# Patient Record
Sex: FEMALE | Race: WHITE | URBAN - METROPOLITAN AREA
[De-identification: names, ages, dates, MRNs, and addresses within clinical notes are randomized per-mention and may not be internally consistent; named-entity substitution may affect disease eponyms.]

---

## 2017-03-08 ENCOUNTER — TRANSFERRED RECORDS (OUTPATIENT)
Dept: HEALTH INFORMATION MANAGEMENT | Facility: CLINIC | Age: 57
End: 2017-03-08

## 2018-06-21 ENCOUNTER — MEDICAL CORRESPONDENCE (OUTPATIENT)
Dept: HEALTH INFORMATION MANAGEMENT | Facility: CLINIC | Age: 58
End: 2018-06-21

## 2018-07-10 ENCOUNTER — OFFICE VISIT (OUTPATIENT)
Dept: PSYCHIATRY | Facility: CLINIC | Age: 58
End: 2018-07-10
Payer: COMMERCIAL

## 2018-07-10 VITALS
HEART RATE: 101 BPM | SYSTOLIC BLOOD PRESSURE: 124 MMHG | TEMPERATURE: 95.2 F | DIASTOLIC BLOOD PRESSURE: 85 MMHG | WEIGHT: 169.4 LBS

## 2018-07-10 DIAGNOSIS — F33.2 SEVERE EPISODE OF RECURRENT MAJOR DEPRESSIVE DISORDER, WITHOUT PSYCHOTIC FEATURES (H): Primary | ICD-10-CM

## 2018-07-10 RX ORDER — DEXTROAMPHETAMINE SACCHARATE, AMPHETAMINE ASPARTATE MONOHYDRATE, DEXTROAMPHETAMINE SULFATE AND AMPHETAMINE SULFATE 7.5; 7.5; 7.5; 7.5 MG/1; MG/1; MG/1; MG/1
30 CAPSULE, EXTENDED RELEASE ORAL
COMMUNITY
Start: 2018-05-08

## 2018-07-10 RX ORDER — GAUZE BANDAGE 4" X 4"
1200 BANDAGE TOPICAL
COMMUNITY
Start: 2011-09-08

## 2018-07-10 RX ORDER — PAROXETINE 30 MG/1
60 TABLET, FILM COATED ORAL
COMMUNITY

## 2018-07-10 RX ORDER — KETOROLAC TROMETHAMINE 30 MG/ML
30-60 INJECTION, SOLUTION INTRAMUSCULAR; INTRAVENOUS
COMMUNITY
Start: 2015-06-03

## 2018-07-10 RX ORDER — BUPROPION HYDROCHLORIDE 150 MG/1
TABLET ORAL
COMMUNITY
Start: 2018-05-08

## 2018-07-10 RX ORDER — ALPRAZOLAM 0.5 MG
TABLET ORAL
COMMUNITY
Start: 2018-05-08

## 2018-07-10 ASSESSMENT — PAIN SCALES - GENERAL: PAINLEVEL: MODERATE PAIN (5)

## 2018-07-10 NOTE — MR AVS SNAPSHOT
After Visit Summary   7/10/2018    Samira Romero    MRN: [id number]           Patient Information     Date Of Birth          1960        Visit Information        Provider Department      7/10/2018 4:00 PM Cristine Snow MD [facility]        Today's Diagnoses     Severe episode of recurrent major depressive disorder, without psychotic features (H)    -  1       Follow-ups after your visit        Who to contact     Please call your clinic at [phone] to:    Ask questions about your health    Make or cancel appointments    Discuss your medicines    Learn about your test results    Speak to your doctor            Additional Information About Your Visit        MyChart Information     loanDepot is an electronic gateway that provides easy, online access to your medical records. With loanDepot, you can request a clinic appointment, read your test results, renew a prescription or communicate with your care team.     To sign up for loanDepot visit the website at [email or web address]   You will be asked to enter the access code listed below, as well as some personal information. Please follow the directions to create your username and password.     Your access code is: [email or web address]  Expires: 10/8/2018  4:08 PM     Your access code will  in 90 days. If you need help or a new code, please contact your [facility] or call [phone] for assistance.        Care EveryWhere ID     This is your Care EveryWhere ID. This could be used by other organizations to access your [facility] medical records  [id number]        Your Vitals Were     Pulse Temperature                101 95.2  F (35.1  C)           Blood Pressure from Last 3 Encounters:   07/10/18 124/85    Weight from Last 3 Encounters:   07/10/18 76.8 kg (169 lb 6.4 oz)              Today, you had the following     No orders found for display       Primary Care Provider Office Phone # Fax #    [facility] [place] [facility] [phone]       [address] [facility] [address] [place] [address]        Equal Access to Services     BIANCA KHAN : Hadii faye jimenez risao Sovitaali, waaxda luqadaha, qaybta kaalmada adevenkatada, jaimee skinnerbeth lamavalentino romero jade robertson. So [facility] [phone].    ATENCIÓN: Si habla español, tiene a cole disposición servicios gratuitos de asistencia lingüística. Llame al [phone].    We comply with applicable federal civil rights laws and [place] laws. We do not discriminate on the basis of race, color, national origin, age, disability, sex, sexual orientation, or gender identity.            Thank you!     Thank you for choosing [facility]  for your care. Our goal is always to provide you with excellent care. Hearing back from our patients is one way we can continue to improve our services. Please take a few minutes to complete the written survey that you may receive in the mail after your visit with us. Thank you!             Your Updated Medication List - Protect others around you: Learn how to safely use, store and throw away your medicines at www.disposemymeds.org.          This list is accurate as of 7/10/18 11:59 PM.  Always use your most recent med list.                   Brand Name Dispense Instructions for use Diagnosis    ALPRAZolam 0.5 MG tablet    XANAX     CANCEL PRIOR RX -- UPDATED DOSE INSTRUCTIONS -- Take one-half to one tab by mouth at bedtime as needed for anxiety.        amphetamine-dextroamphetamine 30 MG per 24 hr capsule    ADDERALL XR     Take 30 mg by mouth        buPROPion 150 MG 24 hr tablet    WELLBUTRIN XL     TAKE 2 TABLETS EVERY       MORNING        cholecalciferol 5000 units Caps capsule    vitamin D3          Fish Oil 435 MG Caps      Take 1,200 mg by mouth        ketorolac 60 MG/2ML Soln injection    TORADOL     Inject 30-60 mg into the muscle        MULTIPLE VITAMINS-MINERALS PO           Octacosanol 1000-5 MCG-UNIT Tabs            PARoxetine 30 MG tablet    PAXIL     Take 60 mg by mouth

## 2018-07-10 NOTE — PROGRESS NOTES
" [facility] TMS [facility]  [address], [address]  [place] [address]  New Patient Evaluation       Samira Romero is a 57 year old female who prefers the name \"Samira\" and pronoun she.  Therapist: None  PCP: [facility] [place]  Other Providers: Psychiatrist is Dr. Kristine Munoz at [facility]  Referred by Dr. Munoz through [facility] for evaluation of depression.     History was provided by patient who was a good historian.      Chief Complaint                                                                                                        \" I am here because I have been on my medications for 25 years and have never had a great improvement in my mood. \"     History of Present Illness                                                                                4, 4      Pertinent Background:  See section specific documentation.  Psych critical item history includes mutiple psychotropic trials and passive suicidal ideation'.     Patient reports that she first experienced symptoms of depression 25 years ago. 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 She describes \"falling apart at school.\" She describes leaving work on a day in April and was on a medical leave of absence for the next two years. She states that she eventually resigned and never launched herself into another career. She describes a lot of guilt and shame about this. States that one of the reasons that she left was that she was struggling with several male students in her classroom who did not respect women as authority figures. She describes feeling trapped in the classroom with these students who were labelled \"emotionally and behaviorally distrubed.\"     Began seeing a psychiatrist around this time. She was started on Prozac at that time. She states that it was difficult to tell if it helped as she \"was " "crying all the time\" and just \"could not cope\" with how she had had to leave her job. Also started individual therapy as well as a support group for people with depression. She states that she \"never really got anywhere\" with any of these treatment modalities. At one point, her psychiatrist tested her for ADHD and she was started on a stimulant. She reports that the stimulants helped with the lethargy and fatigue associated with her symptoms of depression.    Over the past 10 years she reports that she has started to use alprazolam occasionally in the eventings for management of anxiety that prevents her from sleeping. She also reports that she has been trying to take Adderall in the mornings if she is able to get out of bed before 11am.    She reports that she has been seen by multiple doctors over the years, some more aggressive with medications than others. She reports that she is unsure if her depression has ever really improved. She also questions whether she is even depressed at all or \"just whiny.\" Patient reports that she started to wean herself off of her antidepressant medications in April after reading several articles on the benefits of psychedelic drugs.    With regard to trauma, pt reports taht she saw a trauma specialist in [facility] because she feels that she was traumatized while in school teaching these difficult children. She also reprots that her best friend from  through 9th grade  from aplastic anemia. She  just before they would have started their sophomore year of high school. She describes being given a lot of attention that year because she was \"very important in Latoya's life.\" She describes getting a lot of recognition for being so courageous to be in her friend's life. Also describes how her first boyfriend was killed in a drunken accident altough they weren't together at the time. Denies having intrusive thoughts, dreams, or experiences that she feels are related to these " experiences.    Psychiatric Review of Symptoms:   Depression: Positive for depressive symptoms including sadness, irritability, anhedonia, increased appetite, hypersomnia, fatigue, feeling worthless, inappropriate guilt, indecisiveness, and passive thoughts of death.   Anxiety: Describes significant worry over things that she does not have control over.    PTSD: Denies traumatic experience of sufficient severity to meet criterion A necessary for diagnosis of PTSD.   Una: Negative  Psychosis: Negative   Eating disorder: Negative  Homicidal Ideation: Negative    Recent Substance Use:  none reported      Substance Use History     None     Psychiatric History     Suicidal ideation- Describes a history of having SI, most recently 6 months ago.   Suicide Attempt:   Denies  SIB- Feels that compulsive eating is destructive. Also has been diagnosed with trichotillomania.   Una- None    Psychosis- None    Violence/Aggression- None   Psych Hosp- None   ECT- None   Eating Disorder- Questions whether she her compuslive eating    Outpatient Programs [ DBT, Day Treatment, Eating Disorder Tx etc]- Has participated in day treatment on two occasions. Completed DBT at [facility].        Psychiatric Medication Trials     Prozac (fluoxetine), Zoloft (sertraline), Paxil (paroxetine), Celexa (citalopram), Lexapro (escitalopram), Wellbutrin, Zyban, Aplenzin (bupropion), Effexor (venlafaxine), Remeron (mirtazepine) and Cymbalta (duloxetine)  nortriptyline  Lithium Carbonate and Lamictal (lamotrigine)  Risperdal (risperidone) and Abilify (aripriprazole)  Xanax (alprazolam)  Adderall (amphetamine salts) and Ritalin (methylphenidate)     Previously filled at [facility]. Now filling prescriptions at [facility].                                                           Drug /  Lenth of Trial Dose (mg) Helpful Adverse Effects DC Reason / Date   escitalopram / 1 year 40 mg No denies Ineffective   Paroxetine / > 6 years 60 mg No denies  Current medication   Bupropion / > 6 years 450 mg Initially helpful but lost effect denies Current medication   Adderall  30 mg For ADHD denies Current medication   Duloxetine / 5 years 120 mg Initially effective but lost benefit denies Ineffective   Lamotrigine / 4 years 75 mg No denies Current medication   Aripiprazole / 3 months 15 mg No denies Ineffective    Risperidone / 1 year 2 mg No denies Ineffective                            Social/ Family History               [per patient report]                                                 1ea, 1ea     FINANCIAL SUPPORT- unemployed       CHILDREN- 1 daughter (27yo)       LIVING SITUATION- Lives with  in a house in [place]      LEGAL- None  EARLY HISTORY/ EDUCATION- Graduated from college at [employer]. Went to graduate school at [employer]  SOCIAL/ SPIRITUAL SUPPORT- No spiritual community.         TRAUMA HISTORY (self-report)- See HPI  FEELS SAFE AT HOME- Yes     Family history: maternal grandmother was bipolar     Medical / Surgical History   There is no problem list on file for this patient.      No past surgical history on file.      Medical Review of Systems                                                                                                 2, 10     GENERAL: Negative for malaise, significant weight loss and fever  HEENT: No changes in hearing or vision, no nose bleeds or other nasal problems and Negative for frequent or significant headaches  NECK: Negative for lumps, goiter, pain and significant neck swelling  RESPIRATORY: Negative for cough, wheezing and shortness of breath  CARDIOVASCULAR: Negative for chest pain, leg swelling and palpitations  GI: Negative for abdominal discomfort, blood in stools or black stools and change in bowel habits  : Negative for dysuria, frequency and incontinence  MUSCULOSKELETAL: Negative for joint pain or swelling, back pain, and muscle pain.  SKIN: Negative for lesions, rash, and itching.  PSYCH:  See HPI  HEMATOLOGY/LYMPHOLOGY Negative for prolonged bleeding, bruising easily, and swollen nodes.  ENDOCRINE: Negative for cold or heat intolerance, polyuria, polydipsia and goiter.  NEURO:  migraine headaches  The remainder of the review of systems is noncontributory    Metals Screen   Yes No Item    x  Implanted or lodged metals in body     x Implanted surgical devices     x Metal containing facial or scalp tattoos     x Non removable piercings   Pin in first digit of R hand  Seizure Screen  Yes No Item     x Current Seizure Disorder?     x History of Seizure?     Does patient have a cochlear implant? 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     Allergy   Review of patient's allergies indicates not on file.     Current Medications     Current Outpatient Prescriptions   Medication Sig Dispense Refill     ALPRAZolam (XANAX) 0.5 MG tablet CANCEL PRIOR RX -- UPDATED DOSE INSTRUCTIONS -- Take one-half to one tab by mouth at bedtime as needed for anxiety.       amphetamine-dextroamphetamine (ADDERALL XR) 30 MG per 24 hr capsule Take 30 mg by mouth       buPROPion (WELLBUTRIN XL) 150 MG 24 hr tablet TAKE 2 TABLETS EVERY       MORNING       cholecalciferol (VITAMIN D3) 5000 units CAPS capsule        ketorolac (TORADOL) 60 MG/2ML SOLN injection Inject 30-60 mg into the muscle       Misc Natural Products (OCTACOSANOL) 1000-5 MCG-UNIT TABS        MULTIPLE VITAMINS-MINERALS PO        Omega-3 Fatty Acids (FISH OIL) 435 MG CAPS Take 1,200 mg by mouth       PARoxetine (PAXIL) 30 MG tablet Take 60 mg by mouth          Vitals                                                                                                                        3,  3     /85 (BP Location: Right arm, Patient Position: Chair, Cuff Size: Adult Regular)  Pulse 101  Temp 95.2  F (35.1  C)  Wt 76.8 kg (169 lb 6.4 oz)      Mental Status Exam                                                                                   9, 14 cog gs     Alertness: alert  and oriented  Appearance: well groomed  Behavior/Demeanor: cooperative and calm, with good  eye contact   Speech: regular rate and rhythm  Language: intact and no obvious problem  Psychomotor: normal or unremarkable  Mood: depressed and anxious  Affect: restricted; was congruent to mood; was congruent to content  Thought Process/Associations: somewhat tangential  Thought Content:  Reports passive suicidal ideation with plan or intent;  Denies violent ideation and delusions  Perception:  Reports none;  Denies auditory hallucinations and visual hallucinations  Insight: good  Judgment: good  Cognition: (6) oriented: time, person, and place  attention span: intact  concentration: intact  recent memory: intact  remote memory: intact  fund of knowledge: appropriate  Gait and Station: unremarkable     Labs and Data     Rating Scales:    MADRS:     No lab results found.  No lab results found.    Diagnosis and Assessment                                                                             m2, h3     Samira Romero is a female with previous psychiatric history of MDD, recurrent, severe who presents for evaluation of candidacy for Transcranial Magnetic Stimulation for treatment resistant depression. She has a well documented failure of adequate trials of >= 4 antidepressants which represent multiple antidepressant classes as well as augmentation therapies. The patient has completed an adequate dose of individual psychotherapy. Due to remaining profound depression and numerous failed previous treatment modalities the patient is a candidate for TMS treatment.     The risks, benefits, alternatives and potential adverse effects  have been explained and are understood by the patient. Samira Romero agrees to the treatment plan with the ability to do so. The pt knows to call the clinic for any problems or access emergency care if needed. There are medical considerations relevant to treatment, as noted above. Substance use is not a problem as noted above.       The patient understands that treatment consists of up to 37 treatment sessions. The patient cannot miss more than two sessions during treatment course, or course will be invalidated. The patient may not drink any alcohol or use any illicit drugs during treatment. The patient may not make any medication changes during the course of treatment. After treatment is complete, the patient will transfer back to the referring provider.     Suicide Risk Assessment:  Today Samira Romero reports passive suicidal ideation without plan or intent. In addition, she has notable risk factors for self-harm, including family history of suicide. However, risk is mitigated by no h/o suicide attempt, no plan or intent, describes a safety plan, h/o seeking help when needed, future oriented, none to minimal alcohol use , commitment to family, good social support   and stable housing. Therefore, based on all available evidence including the factors cited above, she does not appear to be at imminent risk for self-harm, does not meet criteria for a 72-hr hold, and therefore involuntary hospitalization will not be pursued at this  time. However, based on degree of symptoms, voluntary referral for outpatient rTMS was recommended, she accepted this offer.    Today the following issues were addressed:    1) Major depressive disorder, recurrent, severe without psychotic features    [place] Prescription Monitoring Program [] review was not needed today.    PSYCHOTROPIC DRUG INTERACTIONS: none clinically relevant    Plan                                                                                                                      m2, h3     1) MDD, recurrent, severe  -- Medications: Continue current psychotropic medication regimen  -- Psychotherapy: Continue individual outpatient psychotherapy  -- Procedures:   - Pt is approved for an acute course of repetitive transcranial magnetic stimulation   - Coil: F8   -- Referrals: none    RTC: Within 4-6 weeks to start acute course of rTMS    CRISIS NUMBERS:   Provided routinely in AVS.    Treatment Risk Statement:  The patient understands the risks, benefits, adverse effects and alternatives. Agrees to treatment with the capacity to do so. No medical contraindications to treatment. Agrees to call clinic for any problems. The patient understands to call 911 or go to the nearest ED if life threatening or urgent symptoms occur.     WHODAS 2.0  TODAY total score = N/A; [a 12-item WHODAS 2.0 assessment was not completed by the pt today and/or recorded in EPIC].     PROVIDER:  Cristine Snow MD        Review of Medical Records                                                                                 Reviewed patient's extensive history of previous medication trials and psychotherapy in preparation for comprehensive evaluation and consideration of candidacy for rTMS for management of treatment-resistant depression.    Total time: 60 minutes

## 2018-07-11 ASSESSMENT — PATIENT HEALTH QUESTIONNAIRE - PHQ9: SUM OF ALL RESPONSES TO PHQ QUESTIONS 1-9: 14

## 2018-08-23 ENCOUNTER — TELEPHONE (OUTPATIENT)
Dept: PSYCHIATRY | Facility: CLINIC | Age: 58
End: 2018-08-23

## 2018-08-23 NOTE — TELEPHONE ENCOUNTER
-Writer received call from patient stating that she was approved for TMS and wanted to get scheduled.     -Writer called her back and told her that she will follow up with  tomorrow and follow up with her after that.

## 2018-08-27 ENCOUNTER — HEALTH MAINTENANCE LETTER (OUTPATIENT)
Age: 58
End: 2018-08-27

## 2018-09-12 ENCOUNTER — OFFICE VISIT (OUTPATIENT)
Dept: PSYCHIATRY | Facility: CLINIC | Age: 58
End: 2018-09-12
Payer: COMMERCIAL

## 2018-09-12 VITALS
TEMPERATURE: 98.9 F | DIASTOLIC BLOOD PRESSURE: 70 MMHG | SYSTOLIC BLOOD PRESSURE: 131 MMHG | HEART RATE: 94 BPM | WEIGHT: 166.8 LBS

## 2018-09-12 DIAGNOSIS — F33.2 SEVERE EPISODE OF RECURRENT MAJOR DEPRESSIVE DISORDER, WITHOUT PSYCHOTIC FEATURES (H): Primary | ICD-10-CM

## 2018-09-12 NOTE — PROGRESS NOTES
[facility] TMS [facility]  [address], [address]  [place] [address]  TMS Procedure Note   Samira Romero MRN# [id number]  Age: 58 year old year old YOB: 1960    Pre-Procedure:  History and Physical: Reviewed in medical record  Consent Signed by: Samira Romero  On: 09/12/18  Reviewed possible side effects associated with treatment as well as questions regarding possible course of treatments. Pt agreed to procedure and was able to reflect implications.    Clinical Narrative:  Pt presents to initiate an acute course of dTMS for management of her symptoms of depression that have not responded to conventional interventions. She endorses low mood, anhedonia, amotivation, fatigue, anxiety, disrupted sleep, concentration difficulties, increased appetite, and self-critical thoughts. Denies suicidal ideation.    Indications for TMS:  MDD, recurrent, severe; 4+ medication trials (from 2+ classes) ineffective; Psychotherapy ineffective.     Pre-Procedure Diagnosis:  MDD, recurrent, severe 296.33    Treatment Hx:  Treatment number this series: 1  Total lifetime treatment number: 1    Allergies   Allergen Reactions     Codeine Nausea and Vomiting     Penicillins Rash      /70 (BP Location: Right arm, Patient Position: Chair, Cuff Size: Adult Regular)  Pulse 94  Temp 98.9  F (37.2  C) (Temporal)  Wt 75.7 kg (166 lb 12.8 oz)    Pause for the Cause  Right patient:  Yes  Right procedure/correct coil:  Yes; rTMS; cpt 81224; H1 coil.   Earplugs in place:  Yes    Procedure  Patient was seated in procedure chair. Identity and procedure was verified. Ear plugs were placed in ears and patient-specific cap was placed on head and tightened appropriately. Ruler locations were placed on head per  specifications. Coil was placed at 0 - 7 - 14.5 and stimulator was set to initial 50%.  Mapping pulses were delivered and response was quantified by either visual inspection or EMG waveform.  MT was  found with specific location and energy detailed below. Coil was then placed at treatment location and stimulator was set to parameters described below. A test train was delivered and pt tolerated train. Given pt tolerance, 55 treatment trains were delivered. Pt tolerated procedure well with some minimal right eyebrow and facial activation.    Motor Threshold Determination  Distance from nasion to inion: 36 cm  MT 1: 0 - 7- 14.5 @ 49% on 9/12/2018    Stimulation Parameters  Frequency: 18 Hz     Train duration: 2 sec  Total pulses delivered: 1980  Inter-train interval: 20 sec  Tx Loc: 0 - eyebrows  - 14.5  Energy: 49% (100% MT)  Trains: 55 trains    Psychiatric Examination  Appearance:  awake, alert and adequately groomed  Attitude:  cooperative  Eye Contact:  fair  Mood:  anxious and depressed  Affect:  mood congruent, intensity is blunted, restricted range and reactive  Speech:  clear, coherent and normal prosody  Psychomotor Behavior:  no evidence of tardive dyskinesia, dystonia, or tics and intact station, gait and muscle tone  Thought Process:  logical, linear and goal oriented  Associations:  no loose associations  Thought Content:  no evidence of suicidal ideation or homicidal ideation and no evidence of psychotic thought  Insight:  good  Judgment:  intact  Oriented to:  time, person, and place  Attention Span and Concentration:  intact  Recent and Remote Memory:  intact  Language: Able to name objects, Able to repeat phrases and Able to read and write  Fund of Knowledge: appropriate  Muscle Strength and Tone: normal  Gait and Station: Normal    Post-Procedure Diagnosis:  MDD, recurrent, severe 296.33    Plan   - Cont TMS  - Increase energy as tolerated     Cristine Snow MD  [facility] Neuromodulation

## 2018-09-12 NOTE — MR AVS SNAPSHOT
After Visit Summary   9/12/2018    Samira Romero    MRN: [id number]           Patient Information     Date Of Birth          1960        Visit Information        Provider Department      9/12/2018 1:00 PM ME [facility]MP PROCEDURE ROOM [facility] Psych[facility]        Today's Diagnoses     Severe episode of recurrent major depressive disorder, without psychotic features (H)    -  1       Follow-ups after your visit        Your next 10 appointments already scheduled     Sep 13, 2018  1:45 PM CDT   TMS TREATMENT with ME UMP PROCEDURE ROOM   [facility] ([facility])    [address]  [facility] [address]   [phone]            Sep 14, 2018  1:45 PM CDT   TMS TREATMENT with ME UMP PROCEDURE ROOM   [facility] ([facility])    [address]  [facility] [address]   [phone]            Sep 17, 2018  1:45 PM CDT   TMS TREATMENT with ME UMP PROCEDURE ROOM   [facility] ([facility])    [address]  [facility] [address]   [phone]            Sep 18, 2018  1:45 PM CDT   TMS TREATMENT with ME UMP PROCEDURE ROOM   [facility] ([facility])    [address]  [facility] [address]   [phone]            Sep 19, 2018  1:45 PM CDT   TMS TREATMENT with ME UMP PROCEDURE ROOM   [facility] ([facility])    [address]  [facility] [address]   [phone]            Sep 24, 2018  1:45 PM CDT   TMS TREATMENT with ME UMP PROCEDURE ROOM   [facility] ([facility])    [address]  [facility] [address]   [phone]            Sep 25, 2018  1:45 PM CDT   TMS TREATMENT with ME UMP PROCEDURE ROOM   [facility] ([facility])    [address]  [facility] [address]   [phone]            Sep 26, 2018  1:45 PM CDT   TMS TREATMENT with ME UMP PROCEDURE ROOM   [facility]  Psychiatry ([facility])    [address]  [facility] [address]   [phone]            Sep 27, 2018  1:45 PM CDT   TMS TREATMENT with ME UMP PROCEDURE ROOM   [facility] Psychiatry ([facility])    [address]  [facility] [address]   [phone]            Sep 28, 2018  1:45 PM CDT   TMS TREATMENT with ME UMP PROCEDURE ROOM   [facility] Psychiatry ([facility])    [address]  [facility] [address] [phone]              Who to contact     Please call your clinic at [phone] to:    Ask questions about your health    Make or cancel appointments    Discuss your medicines    Learn about your test results    Speak to your doctor            Additional Information About Your Visit        [employer] Information     [employer] gives you secure access to your electronic health record. If you see a primary care provider, you can also send messages to your care team and make appointments. If you have questions, please call your primary care clinic.  If you do not have a primary care provider, please call [phone] and they will assist you.      S[employer]thold is an electronic gateway that provides easy, online access to your medical records. With [employer], you can request a clinic appointment, read your test results, renew a prescription or communicate with your care team.     To access your existing account, please contact your [facility] or call [phone] for assistance.        Care EveryWhere ID     This is your Care EveryWhere ID. This could be used by other organizations to access your [place] medical records  [id number]        Your Vitals Were     Pulse Temperature                94 98.9  F (37.2  C) (Temporal)           Blood Pressure from Last 3 Encounters:   09/12/18 131/70   07/10/18 124/85    Weight from Last 3 Encounters:   09/12/18 75.7 kg (166 lb 12.8 oz)   07/10/18 76.8 kg (169 lb 6.4  oz)              We Performed the Following     C TRANSCRANIAL MAGNETIC STIMULATION TREATMENT,PLANNING        Primary Care Provider Office Phone # Fax #    [facility] [phone] [address] [facility] [address]        Equal Access to Services     BIANCA KHAN : Hadii faye ku hadasho Soomaali, waaxda luqadaha, qaybta kaalmada adeegyada, waxay lloydin haybillbeth ruiztikitrey robertson. So [facility] [phone].    ATENCIÓN: Si habla español, tiene a cole disposición servicios gratuitos de asistencia lingüística. Llame al [phone].    We comply with applicable federal civil rights laws and [place] laws. We do not discriminate on the basis of race, color, national origin, age, disability, sex, sexual orientation, or gender identity.            Thank you!     Thank you for choosing [facility] PSYCHIATRY  for your care. Our goal is always to provide you with excellent care. Hearing back from our patients is one way we can continue to improve our services. Please take a few minutes to complete the written survey that you may receive in the mail after your visit with us. Thank you!             Your Updated Medication List - Protect others around you: Learn how to safely use, store and throw away your medicines at www.disposemymeds.org.          This list is accurate as of 9/12/18  2:32 PM.  Always use your most recent med list.                   Brand Name Dispense Instructions for use Diagnosis    ALPRAZolam 0.5 MG tablet    XANAX     CANCEL PRIOR RX -- UPDATED DOSE INSTRUCTIONS -- Take one-half to one tab by mouth at bedtime as needed for anxiety.        amphetamine-dextroamphetamine 30 MG per 24 hr capsule    ADDERALL XR     Take 30 mg by mouth        buPROPion 150 MG 24 hr tablet    WELLBUTRIN XL     TAKE 2 TABLETS EVERY       MORNING        cholecalciferol 5000 units Caps capsule    vitamin D3          Fish Oil 435 MG Caps      Take 1,200 mg by mouth        ketorolac  60 MG/2ML Soln injection    TORADOL     Inject 30-60 mg into the muscle        MULTIPLE VITAMINS-MINERALS PO           Octacosanol 1000-5 MCG-UNIT Tabs           PARoxetine 30 MG tablet    PAXIL     Take 60 mg by mouth

## 2018-09-13 ENCOUNTER — OFFICE VISIT (OUTPATIENT)
Dept: PSYCHIATRY | Facility: CLINIC | Age: 58
End: 2018-09-13
Payer: COMMERCIAL

## 2018-09-13 VITALS — HEART RATE: 101 BPM | DIASTOLIC BLOOD PRESSURE: 85 MMHG | SYSTOLIC BLOOD PRESSURE: 125 MMHG

## 2018-09-13 DIAGNOSIS — F33.2 SEVERE EPISODE OF RECURRENT MAJOR DEPRESSIVE DISORDER, WITHOUT PSYCHOTIC FEATURES (H): Primary | ICD-10-CM

## 2018-09-13 ASSESSMENT — PATIENT HEALTH QUESTIONNAIRE - PHQ9: SUM OF ALL RESPONSES TO PHQ QUESTIONS 1-9: 10

## 2018-09-13 NOTE — PROGRESS NOTES
[facility] TMS Program  [address]  [place] [address]  TMS Procedure Note   Samira Romero MRN# [id number]  Age: 58 year old year old YOB: 1960    Pre-Procedure:  History and Physical: Reviewed in medical record  Consent Signed by: Samira Romero  On: 09/12/18    Clinical Narrative:  Pt tolerating treatment.    Indications for TMS:  MDD, recurrent, severe; 4+ medication trials (from 2+ classes) ineffective; Psychotherapy ineffective.     Pre-Procedure Diagnosis:  MDD, recurrent, severe 296.33    Treatment Hx:  Treatment number this series: 2  Total lifetime treatment number: 2    Allergies   Allergen Reactions     Codeine Nausea and Vomiting     Penicillins Rash      /85 (BP Location: Left arm, Patient Position: Sitting, Cuff Size: Adult Regular)  Pulse 101    Pause for the Cause  Right patient:  Yes  Right procedure/correct coil:  Yes; rTMS; cpt 39738; H1 coil.   Earplugs in place:  Yes    Procedure  Patient was seated in procedure chair. Identity and procedure was verified. Ear plugs were placed in ears and patient-specific cap was placed on head and tightened appropriately. Ruler locations were verified. Coil was placed at treatment location and stimulator was set to parameters described below. A test train was delivered and pt tolerated train. Given pt tolerance, 55 treatment trains were delivered. Pt tolerated procedure with some minimal right eyebrow movement and facial activation.    Motor Threshold Determination  Distance from nasion to inion: 36 cm  MT 1: 0 - 7- 14.5 @ 49% on 9/12/2018    Stimulation Parameters  Frequency: 18 Hz     Train duration: 2 sec  Total pulses delivered: 1980  Inter-train interval: 20 sec  Tx Loc: 0 - eyebrows  - 14.5  Energy: 53% (110% MT)  Trains: 55 trains    Psychiatric Examination  Appearance:  awake, alert and adequately groomed  Attitude:  cooperative  Eye Contact:  fair  Mood:  anxious and depressed  Affect:  mood congruent,  intensity is blunted, restricted range and reactive  Speech:  clear, coherent and normal prosody  Psychomotor Behavior:  no evidence of tardive dyskinesia, dystonia, or tics and intact station, gait and muscle tone  Thought Process:  logical, linear and goal oriented  Associations:  no loose associations  Thought Content:  no evidence of suicidal ideation or homicidal ideation and no evidence of psychotic thought  Insight:  good  Judgment:  intact  Oriented to:  time, person, and place  Attention Span and Concentration:  intact  Recent and Remote Memory:  intact  Language: Able to name objects, Able to repeat phrases and Able to read and write  Fund of Knowledge: appropriate  Muscle Strength and Tone: normal  Gait and Station: Normal    Post-Procedure Diagnosis:  MDD, recurrent, severe 296.33      Yanci Goss RN  [facility]    Plan   - Cont TMS  - Increase energy as tolerated     I didn t see the patient during/after treatment but remained available in the clinic during  treatment.    Cristine Snow MD  [facility]

## 2018-09-13 NOTE — MR AVS SNAPSHOT
After Visit Summary   9/13/2018    Samira Romero    MRN: [id number]           Patient Information     Date Of Birth          1960        Visit Information        Provider Department      9/13/2018 1:45 PM ME [facility]MP PROCEDURE ROOM [facility] Psychiatry        Today's Diagnoses     Severe episode of recurrent major depressive disorder, without psychotic features (H)    -  1       Follow-ups after your visit        Your next 10 appointments already scheduled     Sep 17, 2018  1:45 PM CDT   TMS TREATMENT with ME UMP PROCEDURE ROOM   [facility] Psychiatry ([facility])    [address]  [facility] [address]   [phone]            Sep 18, 2018  1:45 PM CDT   TMS TREATMENT with ME UMP PROCEDURE ROOM   [facility] ([facility])    [address]  [facility] [address]   [phone]            Sep 19, 2018  1:45 PM CDT   TMS TREATMENT with ME UMP PROCEDURE ROOM   [facility] ([facility])    [address]  [facility] [address]   [phone]            Sep 24, 2018  1:45 PM CDT   TMS TREATMENT with ME UMP PROCEDURE ROOM   [facility] ([facility])    [address]  [facility] [address]   [phone]            Sep 25, 2018  1:45 PM CDT   TMS TREATMENT with ME UMP PROCEDURE ROOM   [facility] ([facility])    [address]  [facility] [address]   [phone]            Sep 26, 2018  1:45 PM CDT   TMS TREATMENT with ME UMP PROCEDURE ROOM   [facility] ([facility])    [address]  [facility] [address]   [phone]            Sep 27, 2018  1:45 PM CDT   TMS TREATMENT with ME UMP PROCEDURE ROOM   [facility] ([facility])    [address]  [facility] [address]   [phone]            Sep 28, 2018  1:45 PM CDT   TMS TREATMENT with ME UMP PROCEDURE ROOM   [facility]  Psychiatry ([facility])    [address]  [facility] [address]   [phone]            Oct 01, 2018  1:45 PM CDT   TMS TREATMENT with ME UMP PROCEDURE ROOM   [facility] Psychiatry ([facility])    [address]  [facility] [address]   [phone]            Oct 02, 2018  1:45 PM CDT   TMS TREATMENT with ME UMP PROCEDURE ROOM   [facility] Psychiatry ([facility])    [address]  [facility] [address] [phone]              Who to contact     Please call your clinic at [phone] to:    Ask questions about your health    Make or cancel appointments    Discuss your medicines    Learn about your test results    Speak to your doctor            Additional Information About Your Visit        [employer] Information     [employer] gives you secure access to your electronic health record. If you see a primary care provider, you can also send messages to your care team and make appointments. If you have questions, please call your primary care clinic.  If you do not have a primary care provider, please call [phone] and they will assist you.      [employer] is an electronic gateway that provides easy, online access to your medical records. With [employer], you can request a clinic appointment, read your test results, renew a prescription or communicate with your care team.     To access your existing account, please contact your [facility] or call [phone] for assistance.        Care EveryWhere ID     This is your Care EveryWhere ID. This could be used by other organizations to access your [place] medical records  [id number]        Your Vitals Were     Pulse                   101            Blood Pressure from Last 3 Encounters:   09/14/18 145/88   09/13/18 125/85   09/12/18 131/70    Weight from Last 3 Encounters:   09/12/18 75.7 kg (166 lb 12.8 oz)   07/10/18 76.8 kg (169 lb 6.4 oz)               We Performed the Following     C TRANSCRANIAL MAGNETIC STIMULATION TREATMENT,DELIVERY/MANAGEMENT        Primary Care Provider Office Phone # Fax #    [facility] [phone] [address] [facility] [address]        Equal Access to Services     BIANCA KHAN : Hadii aad ku hadkendyo Soomaali, waaxda luqadaha, qaybta kaalmada adeegyada, waxay lloydin haybilln priscila ruiztikitrey robertson. So [facility] [phone].    ATENCIÓN: Si habla español, tiene a cole disposición servicios gratuitos de asistencia lingüística. Llame al [phone].    We comply with applicable federal civil rights laws and [place] laws. We do not discriminate on the basis of race, color, national origin, age, disability, sex, sexual orientation, or gender identity.            Thank you!     Thank you for choosing [facility] PSYCHIATRY  for your care. Our goal is always to provide you with excellent care. Hearing back from our patients is one way we can continue to improve our services. Please take a few minutes to complete the written survey that you may receive in the mail after your visit with us. Thank you!             Your Updated Medication List - Protect others around you: Learn how to safely use, store and throw away your medicines at www.disposemymeds.org.          This list is accurate as of 9/13/18 11:59 PM.  Always use your most recent med list.                   Brand Name Dispense Instructions for use Diagnosis    ALPRAZolam 0.5 MG tablet    XANAX     CANCEL PRIOR RX -- UPDATED DOSE INSTRUCTIONS -- Take one-half to one tab by mouth at bedtime as needed for anxiety.        amphetamine-dextroamphetamine 30 MG per 24 hr capsule    ADDERALL XR     Take 30 mg by mouth        buPROPion 150 MG 24 hr tablet    WELLBUTRIN XL     TAKE 2 TABLETS EVERY       MORNING        cholecalciferol 5000 units Caps capsule    vitamin D3          Fish Oil 435 MG Caps      Take 1,200 mg by mouth        ketorolac 60  MG/2ML Soln injection    TORADOL     Inject 30-60 mg into the muscle        MULTIPLE VITAMINS-MINERALS PO           Octacosanol 1000-5 MCG-UNIT Tabs           PARoxetine 30 MG tablet    PAXIL     Take 60 mg by mouth

## 2018-09-14 ENCOUNTER — OFFICE VISIT (OUTPATIENT)
Dept: PSYCHIATRY | Facility: CLINIC | Age: 58
End: 2018-09-14
Payer: COMMERCIAL

## 2018-09-14 VITALS — HEART RATE: 87 BPM | SYSTOLIC BLOOD PRESSURE: 145 MMHG | DIASTOLIC BLOOD PRESSURE: 88 MMHG

## 2018-09-14 DIAGNOSIS — F33.2 SEVERE EPISODE OF RECURRENT MAJOR DEPRESSIVE DISORDER, WITHOUT PSYCHOTIC FEATURES (H): Primary | ICD-10-CM

## 2018-09-14 ASSESSMENT — PATIENT HEALTH QUESTIONNAIRE - PHQ9: SUM OF ALL RESPONSES TO PHQ QUESTIONS 1-9: 4

## 2018-09-14 NOTE — MR AVS SNAPSHOT
After Visit Summary   9/14/2018    Samira Romero    MRN: [id number]           Patient Information     Date Of Birth          1960        Visit Information        Provider Department      9/14/2018 1:45 PM ME UMP PROCEDURE ROOM [facility] Psychiatry        Today's Diagnoses     Severe episode of recurrent major depressive disorder, without psychotic features (H)    -  1       Follow-ups after your visit        Your next 10 appointments already scheduled     Sep 17, 2018  1:45 PM CDT   TMS TREATMENT with ME UMP PROCEDURE ROOM   [facility] Psychiat[facility] ([facility])    [address]  [facility] [address]   [phone]            Sep 18, 2018  1:45 PM CDT   TMS TREATMENT with ME UMP PROCEDURE ROOM   [facility] ([facility])    [address]  [facility] [address]   [phone]            Sep 19, 2018  1:45 PM CDT   TMS TREATMENT with ME UMP PROCEDURE ROOM   [facility] ([facility])    [address]  [facility] [address]   [phone]            Sep 24, 2018  1:45 PM CDT   TMS TREATMENT with ME UMP PROCEDURE ROOM   [facility] ([facility])    [address]  [facility] [address]   [phone]            Sep 25, 2018  1:45 PM CDT   TMS TREATMENT with ME UMP PROCEDURE ROOM   [facility] ([facility])    [address]  [facility] [address]   [phone]            Sep 26, 2018  1:45 PM CDT   TMS TREATMENT with ME UMP PROCEDURE ROOM   [facility] ([facility])    [address]  [facility] [address]   [phone]            Sep 27, 2018  1:45 PM CDT   TMS TREATMENT with ME UMP PROCEDURE ROOM   [facility] ([facility])    [address]  [facility] [address]   [phone]            Sep 28, 2018  1:45 PM CDT   TMS TREATMENT with ME UMP PROCEDURE ROOM   [facility]  Psychiatry ([facility])    [address]  [facility] [address]   [phone]            Oct 01, 2018  1:45 PM CDT   TMS TREATMENT with ME UMP PROCEDURE ROOM   [facility] Psychiatry ([facility])    [address]  [facility] [address]   [phone]            Oct 02, 2018  1:45 PM CDT   TMS TREATMENT with ME UMP PROCEDURE ROOM   [facility] Psychiatry ([facility])    [address]  [facility] [address] [phone]              Who to contact     Please call your clinic at [phone] to:    Ask questions about your health    Make or cancel appointments    Discuss your medicines    Learn about your test results    Speak to your doctor            Additional Information About Your Visit        [employer] Information     [employer] gives you secure access to your electronic health record. If you see a primary care provider, you can also send messages to your care team and make appointments. If you have questions, please call your primary care clinic.  If you do not have a primary care provider, please call [phone] and they will assist you.      [employer] is an electronic gateway that provides easy, online access to your medical records. With [employer], you can request a clinic appointment, read your test results, renew a prescription or communicate with your care team.     To access your existing account, please contact your [facility] or call [phone] for assistance.        Care EveryWhere ID     This is your Care EveryWhere ID. This could be used by other organizations to access your [place] medical records  [id number]        Your Vitals Were     Pulse                   87            Blood Pressure from Last 3 Encounters:   09/14/18 145/88   09/13/18 125/85   09/12/18 131/70    Weight from Last 3 Encounters:   09/12/18 75.7 kg (166 lb 12.8 oz)   07/10/18 76.8 kg (169 lb 6.4 oz)              We  Performed the Following     C TRANSCRANIAL MAGNETIC STIMULATION TREATMENT,DELIVERY/MANAGEMENT        Primary Care Provider Office Phone # Fax #    [facility] [phone] [address] [place]  [facility] [address]        Equal Access to Services     BIANCA KHAN : Hadii aad ku hadkendyo Soomaali, waaxda luqadaha, qaybta kaalmada adeegyada, waxay lloydin haybilln priscila ruiztikitrey robertson. So [facility] [phone].    ATENCIÓN: Si habla español, tiene a cole disposición servicios gratuitos de asistencia lingüística. Llame al [phone].    We comply with applicable federal civil rights laws and [place] laws. We do not discriminate on the basis of race, color, national origin, age, disability, sex, sexual orientation, or gender identity.            Thank you!     Thank you for choosing [facility] PSYCHIATRY  for your care. Our goal is always to provide you with excellent care. Hearing back from our patients is one way we can continue to improve our services. Please take a few minutes to complete the written survey that you may receive in the mail after your visit with us. Thank you!             Your Updated Medication List - Protect others around you: Learn how to safely use, store and throw away your medicines at www.disposemymeds.org.          This list is accurate as of 9/14/18  4:47 PM.  Always use your most recent med list.                   Brand Name Dispense Instructions for use Diagnosis    ALPRAZolam 0.5 MG tablet    XANAX     CANCEL PRIOR RX -- UPDATED DOSE INSTRUCTIONS -- Take one-half to one tab by mouth at bedtime as needed for anxiety.        amphetamine-dextroamphetamine 30 MG per 24 hr capsule    ADDERALL XR     Take 30 mg by mouth        buPROPion 150 MG 24 hr tablet    WELLBUTRIN XL     TAKE 2 TABLETS EVERY       MORNING        cholecalciferol 5000 units Caps capsule    vitamin D3          Fish Oil 435 MG Caps      Take 1,200 mg by mouth        ketorolac 60 MG/2ML  Soln injection    TORADOL     Inject 30-60 mg into the muscle        MULTIPLE VITAMINS-MINERALS PO           Octacosanol 1000-5 MCG-UNIT Tabs           PARoxetine 30 MG tablet    PAXIL     Take 60 mg by mouth

## 2018-09-14 NOTE — PROGRESS NOTES
[facility]MS [facility]  [address], [address]  [place] [address]  TMS Procedure Note   Samira Romero MRN# [id number]  Age: 58 year old year old YOB: 1960    Pre-Procedure:  History and Physical: Reviewed in medical record  Consent Signed by: Samira Romero  On: 09/12/18    Clinical Narrative:  Pt tolerating treatment.    Indications for TMS:  MDD, recurrent, severe; 4+ medication trials (from 2+ classes) ineffective; Psychotherapy ineffective.     Pre-Procedure Diagnosis:  MDD, recurrent, severe 296.33    Treatment Hx:  Treatment number this series: 3  Total lifetime treatment number: 3    Allergies   Allergen Reactions     Codeine Nausea and Vomiting     Penicillins Rash      /88 (BP Location: Left arm, Patient Position: Sitting, Cuff Size: Adult Regular)  Pulse 87    Pause for the Cause  Right patient:  Yes  Right procedure/correct coil:  Yes; rTMS; cpt 97854; H1 coil.   Earplugs in place:  Yes    Procedure  Patient was seated in procedure chair. Identity and procedure was verified. Ear plugs were placed in ears and patient-specific cap was placed on head and tightened appropriately. Ruler locations were verified. Coil was placed at treatment location and stimulator was set to parameters described below. A test train was delivered and pt tolerated train. Given pt tolerance, 55 treatment trains were delivered. Pt tolerated procedure with some minimal right eyebrow movement and facial activation.    Motor Threshold Determination  Distance from nasion to inion: 36 cm  MT 1: 0 - 7- 14.5 @ 49% on 9/12/2018    Stimulation Parameters  Frequency: 18 Hz     Train duration: 2 sec  Total pulses delivered: 1980  Inter-train interval: 20 sec  Tx Loc: 0 - eyebrows  - 14.5  Energy: 58% (120% MT)  Trains: 55 trains    Psychiatric Examination  Appearance:  awake, alert and adequately groomed  Attitude:  cooperative  Eye Contact:  fair  Mood:  anxious and depressed  Affect:  mood congruent,  intensity is blunted, restricted range and reactive  Speech:  clear, coherent and normal prosody  Psychomotor Behavior:  no evidence of tardive dyskinesia, dystonia, or tics and intact station, gait and muscle tone  Thought Process:  logical, linear and goal oriented  Associations:  no loose associations  Thought Content:  no evidence of suicidal ideation or homicidal ideation and no evidence of psychotic thought  Insight:  good  Judgment:  intact  Oriented to:  time, person, and place  Attention Span and Concentration:  intact  Recent and Remote Memory:  intact  Language: Able to name objects, Able to repeat phrases and Able to read and write  Fund of Knowledge: appropriate  Muscle Strength and Tone: normal  Gait and Station: Normal    Post-Procedure Diagnosis:  MDD, recurrent, severe 296.33      Yanci Goss RN  [facility]    Plan   - Cont TMS       I didn t see the patient during/after treatment but remained available in the clinic during  treatment.    Cristine Snow MD  [facility]

## 2018-09-15 ASSESSMENT — PATIENT HEALTH QUESTIONNAIRE - PHQ9: SUM OF ALL RESPONSES TO PHQ QUESTIONS 1-9: 2

## 2018-09-17 ENCOUNTER — OFFICE VISIT (OUTPATIENT)
Dept: PSYCHIATRY | Facility: CLINIC | Age: 58
End: 2018-09-17
Payer: COMMERCIAL

## 2018-09-17 VITALS — DIASTOLIC BLOOD PRESSURE: 89 MMHG | HEART RATE: 81 BPM | SYSTOLIC BLOOD PRESSURE: 144 MMHG

## 2018-09-17 DIAGNOSIS — F33.2 SEVERE EPISODE OF RECURRENT MAJOR DEPRESSIVE DISORDER, WITHOUT PSYCHOTIC FEATURES (H): Primary | ICD-10-CM

## 2018-09-17 NOTE — PROGRESS NOTES
[facility] TMS Program  [address], [address]  [place] [address]  TMS Procedure Note   Samira Romero MRN# [id number]  Age: 58 year old year old YOB: 1960    Pre-Procedure:  History and Physical: Reviewed in medical record  Consent Signed by: Samira Romero  On: 09/12/18    Clinical Narrative:  Pt tolerating treatment.    Indications for TMS:  MDD, recurrent, severe; 4+ medication trials (from 2+ classes) ineffective; Psychotherapy ineffective.     Pre-Procedure Diagnosis:  MDD, recurrent, severe 296.33    Treatment Hx:  Treatment number this series: 4  Total lifetime treatment number: 4    Allergies   Allergen Reactions     Codeine Nausea and Vomiting     Penicillins Rash      /89 (BP Location: Left arm, Patient Position: Sitting, Cuff Size: Adult Regular)  Pulse 81    Pause for the Cause  Right patient:  Yes  Right procedure/correct coil:  Yes; rTMS; cpt 16971; H1 coil.   Earplugs in place:  Yes    Procedure  Patient was seated in procedure chair. Identity and procedure was verified. Ear plugs were placed in ears and patient-specific cap was placed on head and tightened appropriately. Ruler locations were verified. Coil was placed at treatment location and stimulator was set to parameters described below. A test train was delivered and pt tolerated train. Given pt tolerance, 55 treatment trains were delivered. Pt tolerated procedure with some minimal right eyebrow movement and facial activation.    Motor Threshold Determination  Distance from nasion to inion: 36 cm  MT 1: 0 - 7- 14.5 @ 49% on 9/12/2018    Stimulation Parameters  Frequency: 18 Hz     Train duration: 2 sec  Total pulses delivered: 1980  Inter-train interval: 20 sec  Tx Loc: 0 - eyebrows  - 14.5  Energy: 58% (120% MT)  Trains: 55 trains    Psychiatric Examination  Appearance:  awake, alert and adequately groomed  Attitude:  cooperative  Eye Contact:  fair  Mood:  anxious and depressed  Affect:  mood congruent,  intensity is blunted, restricted range and reactive  Speech:  clear, coherent and normal prosody  Psychomotor Behavior:  no evidence of tardive dyskinesia, dystonia, or tics and intact station, gait and muscle tone  Thought Process:  logical, linear and goal oriented  Associations:  no loose associations  Thought Content:  no evidence of suicidal ideation or homicidal ideation and no evidence of psychotic thought  Insight:  good  Judgment:  intact  Oriented to:  time, person, and place  Attention Span and Concentration:  intact  Recent and Remote Memory:  intact  Language: Able to name objects, Able to repeat phrases and Able to read and write  Fund of Knowledge: appropriate  Muscle Strength and Tone: normal  Gait and Station: Normal    Post-Procedure Diagnosis:  MDD, recurrent, severe 296.33      Yanci Goss RN  [facility]    Plan   - Cont TMS       I didn t see the patient during/after treatment but remained available in the clinic during  treatment.    Jesus Carlson MD  [facility]

## 2018-09-17 NOTE — MR AVS SNAPSHOT
After Visit Summary   9/17/2018    Samira Romero    MRN: [id number]           Patient Information     Date Of Birth          1960        Visit Information        Provider Department      9/17/2018 1:45 PM [facility] PROCEDURE ROOM [facility]        Today's Diagnoses     Severe episode of recurrent major depressive disorder, without psychotic features (H)    -  1       Follow-ups after your visit        Your next 10 appointments already scheduled     Sep 28, 2018  1:45 PM CDT   TMS TREATMENT with ME [facility] PROCEDURE ROOM   [facility] ([facility])    [address]  [facility] [address]   [phone]            Oct 01, 2018  1:45 PM CDT   TMS TREATMENT with ME [facility] PROCEDURE ROOM   [facility] ([facility])    [address]  [facility] [address]   [phone]            Oct 02, 2018  1:45 PM CDT   TMS TREATMENT with ME [facility] PROCEDURE ROOM   [facility] ([facility])    [address]  [facility] [address]   [phone]            Oct 03, 2018  1:45 PM CDT   TMS TREATMENT with ME [facility] PROCEDURE ROOM   [facility] ([facility])    [address]  [facility] [address]   [phone]            Oct 04, 2018  1:45 PM CDT   TMS TREATMENT with ME [facility] PROCEDURE ROOM   [facility] ([facility])    [address]  [facility] [address]   [phone]            Oct 05, 2018  1:45 PM CDT   TMS TREATMENT with ME [facility] PROCEDURE ROOM   [facility] ([facility])    [address]  [facility] [address]   [phone]            Oct 08, 2018  1:45 PM CDT   TMS TREATMENT with ME [facility] PROCEDURE ROOM   [facility] ([facility])    [address]  [facility] [address]   [phone]            Oct 09, 2018  1:45 PM CDT   TMS TREATMENT with ME [facility] PROCEDURE ROOM   [facility]iat[facility] ([facility])    [address]  [facility] [address]   [phone]            Oct 10, 2018  1:45 PM CDT   TMS TREATMENT with ME [facility] PROCEDURE ROOM   [facility] Psychiatry ([facility])    [address]  [facility] [address] [phone]            Oct 11, 2018  1:45 PM CDT   TMS TREATMENT with ME [facility] PROCEDURE ROOM   [facility] Psychiatry ([facility])    [address]  [facility] [address] [phone]              Who to contact     Please call your clinic at [phone] to:    Ask questions about your health    Make or cancel appointments    Discuss your medicines    Learn about your test results    Speak to your doctor            Additional Information About Your Visit        [employer] Information     [email or web address] gives you secure access to your electronic health record. If you see a primary care provider, you can also send messages to your care team and make appointments. If you have questions, please call your primary care clinic.  If you do not have a primary care provider, please call [phone] and they will assist you.      [email or web address] is an electronic gateway that provides easy, online access to your medical records. With [email or web address], you can request a clinic appointment, read your test results, renew a prescription or communicate with your care team.     To access your existing account, please contact your [facility] or call [phone] for assistance.        Care EveryWhere ID     This is your Care EveryWhere ID. This could be used by other organizations to access your [place] medical records  [id number]        Your Vitals Were     Pulse                   81            Blood Pressure from Last 3 Encounters:   09/27/18 113/88   09/26/18 141/84   09/25/18 133/78    Weight from Last 3 Encounters:   09/12/18 75.7 kg (166 lb 12.8 oz)   07/10/18 76.8 kg (169 lb 6.4 oz)              We  Performed the Following     C TRANSCRANIAL MAGNETIC STIMULATION TREATMENT,DELIVERY/MANAGEMENT        Primary Care Provider Office Phone # Fax #    [facility] [phone] [address]  [facility] [address]        Equal Access to Services     BIANCA KHAN : Hadii faye ku hadkendyo Soomaali, waaxda luqadaha, qaybta kaalmada adeegyada, waxay lloydin haybilln priscila ruiztikitrey robertson. So [facility] [phone].    ATENCIÓN: Si habla español, tiene a cole disposición servicios gratuitos de asistencia lingüística. Llame al [phone].    We comply with applicable federal civil rights laws and [place] laws. We do not discriminate on the basis of race, color, national origin, age, disability, sex, sexual orientation, or gender identity.            Thank you!     Thank you for choosing [facility] PSYCHIATRY  for your care. Our goal is always to provide you with excellent care. Hearing back from our patients is one way we can continue to improve our services. Please take a few minutes to complete the written survey that you may receive in the mail after your visit with us. Thank you!             Your Updated Medication List - Protect others around you: Learn how to safely use, store and throw away your medicines at www.disposemymeds.org.          This list is accurate as of 9/17/18 11:59 PM.  Always use your most recent med list.                   Brand Name Dispense Instructions for use Diagnosis    ALPRAZolam 0.5 MG tablet    XANAX     CANCEL PRIOR RX -- UPDATED DOSE INSTRUCTIONS -- Take one-half to one tab by mouth at bedtime as needed for anxiety.        amphetamine-dextroamphetamine 30 MG per 24 hr capsule    ADDERALL XR     Take 30 mg by mouth        buPROPion 150 MG 24 hr tablet    WELLBUTRIN XL     TAKE 2 TABLETS EVERY       MORNING        cholecalciferol 5000 units Caps capsule    vitamin D3          Fish Oil 435 MG Caps      Take 1,200 mg by mouth        ketorolac 60 MG/2ML  Soln injection    TORADOL     Inject 30-60 mg into the muscle        MULTIPLE VITAMINS-MINERALS PO           Octacosanol 1000-5 MCG-UNIT Tabs           PARoxetine 30 MG tablet    PAXIL     Take 60 mg by mouth

## 2018-09-18 ENCOUNTER — OFFICE VISIT (OUTPATIENT)
Dept: PSYCHIATRY | Facility: CLINIC | Age: 58
End: 2018-09-18
Payer: COMMERCIAL

## 2018-09-18 VITALS — HEART RATE: 88 BPM | DIASTOLIC BLOOD PRESSURE: 75 MMHG | SYSTOLIC BLOOD PRESSURE: 122 MMHG

## 2018-09-18 DIAGNOSIS — F33.2 SEVERE EPISODE OF RECURRENT MAJOR DEPRESSIVE DISORDER, WITHOUT PSYCHOTIC FEATURES (H): Primary | ICD-10-CM

## 2018-09-18 ASSESSMENT — PATIENT HEALTH QUESTIONNAIRE - PHQ9: SUM OF ALL RESPONSES TO PHQ QUESTIONS 1-9: 2

## 2018-09-18 NOTE — MR AVS SNAPSHOT
After Visit Summary   9/18/2018    Samira Romero    MRN: [id number]           Patient Information     Date Of Birth          1960        Visit Information        Provider Department      9/18/2018 1:45 PM [facility] PROCEDURE ROOM [facility] Psych[facility]        Today's Diagnoses     Severe episode of recurrent major depressive disorder, without psychotic features (H)    -  1       Follow-ups after your visit        Your next 10 appointments already scheduled     Oct 05, 2018  1:45 PM CDT   TMS TREATMENT with ME [facility] PROCEDURE ROOM   [facility] ([facility])    [address]  [facility] [address]   [phone]            Oct 08, 2018  1:45 PM CDT   TMS TREATMENT with ME [facility] PROCEDURE ROOM   [facility] ([facility])    [address]  [facility] [address]   [phone]            Oct 09, 2018  1:45 PM CDT   TMS TREATMENT with ME [facility] PROCEDURE ROOM   [facility] ([facility])    [address]  [facility] [address]   [phone]            Oct 10, 2018  1:45 PM CDT   TMS TREATMENT with ME [facility] PROCEDURE ROOM   [facility] ([facility])    [address]  [facility] [address]   [phone]            Oct 11, 2018  1:45 PM CDT   TMS TREATMENT with ME [facility] PROCEDURE ROOM   [facility] ([facility])    [address]  [facility] [address]   [phone]            Oct 12, 2018  1:45 PM CDT   TMS TREATMENT with ME [facility] PROCEDURE ROOM   [facility] ([facility])    [address]  [facility] [address]   [phone]            Oct 15, 2018  1:45 PM CDT   TMS TREATMENT with ME [facility] PROCEDURE ROOM   [facility] ([facility])    [address]  [facility] [address]   [phone]            Oct 16, 2018  1:45 PM CDT   TMS TREATMENT with ME [facility]MP PROCEDURE ROOM   [facility] ([facility])    [address]  [facility] [address] [phone]            Oct 17, 2018  1:45 PM CDT   TMS TREATMENT with ME UMP PROCEDURE ROOM   [facility] Psychiatry ([facility])    [address]  [facility] [address] [phone]            Oct 18, 2018  1:45 PM CDT   TMS TREATMENT with ME [facility] PROCEDURE ROOM   [facility] Psychiatry ([facility])    [address]  [facility] [address] [phone]              Who to contact     Please call your clinic at [phone] to:    Ask questions about your health    Make or cancel appointments    Discuss your medicines    Learn about your test results    Speak to your doctor            Additional Information About Your Visit        [employer] Information     [employer] gives you secure access to your electronic health record. If you see a primary care provider, you can also send messages to your care team and make appointments. If you have questions, please call your primary care clinic.  If you do not have a primary care provider, please call [phone] and they will assist you.      [employer] is an electronic gateway that provides easy, online access to your medical records. With [employer], you can request a clinic appointment, read your test results, renew a prescription or communicate with your care team.     To access your existing account, please contact your [facility] or call [phone] for assistance.        Care EveryWhere ID     This is your Care EveryWhere ID. This could be used by other organizations to access your [place] medical records  [id number]        Your Vitals Were     Pulse                   88            Blood Pressure from Last 3 Encounters:   10/03/18 132/82   10/02/18 131/82   10/01/18 126/76    Weight from Last 3 Encounters:   09/12/18 75.7 kg (166 lb 12.8 oz)   07/10/18 76.8 kg (169 lb 6.4 oz)              We  Performed the Following     C TRANSCRANIAL MAGNETIC STIMULATION TREATMENT,DELIVERY/MANAGEMENT        Primary Care Provider Office Phone # Fax #    [facility] [phone] [address]  [facility] [address]        Equal Access to Services     BIANCA KHAN : Hadii faye ku hadkendyo Soomaali, waaxda luqadaha, qaybta kaalmada adeegyada, waxay lolydin haybilln priscila ruiztikitrey robertson. So [facility] [phone].    ATENCIÓN: Si habla español, tiene a cole disposición servicios gratuitos de asistencia lingüística. Llame al [phone].    We comply with applicable federal civil rights laws and [place] laws. We do not discriminate on the basis of race, color, national origin, age, disability, sex, sexual orientation, or gender identity.            Thank you!     Thank you for choosing [facility] PSYCHIATRY  for your care. Our goal is always to provide you with excellent care. Hearing back from our patients is one way we can continue to improve our services. Please take a few minutes to complete the written survey that you may receive in the mail after your visit with us. Thank you!             Your Updated Medication List - Protect others around you: Learn how to safely use, store and throw away your medicines at www.disposemymeds.org.          This list is accurate as of 9/18/18 11:59 PM.  Always use your most recent med list.                   Brand Name Dispense Instructions for use Diagnosis    ALPRAZolam 0.5 MG tablet    XANAX     CANCEL PRIOR RX -- UPDATED DOSE INSTRUCTIONS -- Take one-half to one tab by mouth at bedtime as needed for anxiety.        amphetamine-dextroamphetamine 30 MG per 24 hr capsule    ADDERALL XR     Take 30 mg by mouth        buPROPion 150 MG 24 hr tablet    WELLBUTRIN XL     TAKE 2 TABLETS EVERY       MORNING        cholecalciferol 5000 units Caps capsule    vitamin D3          Fish Oil 435 MG Caps      Take 1,200 mg by mouth        ketorolac 60 MG/2ML  Soln injection    TORADOL     Inject 30-60 mg into the muscle        MULTIPLE VITAMINS-MINERALS PO           Octacosanol 1000-5 MCG-UNIT Tabs           PARoxetine 30 MG tablet    PAXIL     Take 60 mg by mouth

## 2018-09-18 NOTE — PROGRESS NOTES
[facility] TMS Program  [address]  [place] [address]  TMS Procedure Note   Samira Romero MRN# [id number]  Age: 58 year old year old YOB: 1960    Pre-Procedure:  History and Physical: Reviewed in medical record  Consent Signed by: Samira Romero  On: 09/12/18    Clinical Narrative:  Pt tolerating treatment.    Indications for TMS:  MDD, recurrent, severe; 4+ medication trials (from 2+ classes) ineffective; Psychotherapy ineffective.     Pre-Procedure Diagnosis:  MDD, recurrent, severe 296.33    Treatment Hx:  Treatment number this series: 5  Total lifetime treatment number: 5    Allergies   Allergen Reactions     Codeine Nausea and Vomiting     Penicillins Rash      /75 (BP Location: Left arm, Patient Position: Sitting, Cuff Size: Adult Regular)  Pulse 88    Pause for the Cause  Right patient:  Yes  Right procedure/correct coil:  Yes; rTMS; cpt 77550; H1 coil.   Earplugs in place:  Yes    Procedure  Patient was seated in procedure chair. Identity and procedure was verified. Ear plugs were placed in ears and patient-specific cap was placed on head and tightened appropriately. Ruler locations were verified. Coil was placed at treatment location and stimulator was set to parameters described below. A test train was delivered and pt tolerated train. Given pt tolerance, 55 treatment trains were delivered. Pt tolerated procedure with some minimal right eyebrow movement and facial activation.    Motor Threshold Determination  Distance from nasion to inion: 36 cm  MT 1: 0 - 7- 14.5 @ 49% on 9/12/2018    Stimulation Parameters  Frequency: 18 Hz     Train duration: 2 sec  Total pulses delivered: 1980  Inter-train interval: 20 sec  Tx Loc: 0 - eyebrows  - 14.5  Energy: 58% (120% MT)  Trains: 55 trains    Psychiatric Examination  Appearance:  awake, alert and adequately groomed  Attitude:  cooperative  Eye Contact:  fair  Mood:  anxious and depressed  Affect:  mood congruent,  intensity is blunted, restricted range and reactive  Speech:  clear, coherent and normal prosody  Psychomotor Behavior:  no evidence of tardive dyskinesia, dystonia, or tics and intact station, gait and muscle tone  Thought Process:  logical, linear and goal oriented  Associations:  no loose associations  Thought Content:  no evidence of suicidal ideation or homicidal ideation and no evidence of psychotic thought  Insight:  good  Judgment:  intact  Oriented to:  time, person, and place  Attention Span and Concentration:  intact  Recent and Remote Memory:  intact  Language: Able to name objects, Able to repeat phrases and Able to read and write  Fund of Knowledge: appropriate  Muscle Strength and Tone: normal  Gait and Station: Normal    Post-Procedure Diagnosis:  MDD, recurrent, severe 296.33      Yanci Goss RN  [facility]    Plan   - Cont TMS       I didn t see the patient during/after treatment but remained available in the clinic during  treatment.    JONATHAN Snow MD  [facility]

## 2018-09-19 ENCOUNTER — OFFICE VISIT (OUTPATIENT)
Dept: PSYCHIATRY | Facility: CLINIC | Age: 58
End: 2018-09-19
Payer: COMMERCIAL

## 2018-09-19 VITALS — HEART RATE: 79 BPM | SYSTOLIC BLOOD PRESSURE: 121 MMHG | DIASTOLIC BLOOD PRESSURE: 82 MMHG

## 2018-09-19 DIAGNOSIS — F33.2 SEVERE EPISODE OF RECURRENT MAJOR DEPRESSIVE DISORDER, WITHOUT PSYCHOTIC FEATURES (H): Primary | ICD-10-CM

## 2018-09-19 NOTE — MR AVS SNAPSHOT
After Visit Summary   9/19/2018    Samira Romero    MRN: [id number]           Patient Information     Date Of Birth          1960        Visit Information        Provider Department      9/19/2018 1:45 PM [facility] PROCEDURE ROOM [facility]        Today's Diagnoses     Severe episode of recurrent major depressive disorder, without psychotic features (H)    -  1       Follow-ups after your visit        Your next 10 appointments already scheduled     Sep 24, 2018  1:45 PM CDT   TMS TREATMENT with ME [facility] PROCEDURE ROOM   [facility] ([facility])    [address]  [facility] [address]   [phone]            Sep 25, 2018  1:45 PM CDT   TMS TREATMENT with ME [facility] PROCEDURE ROOM   [facility] ([facility])    [address]  [facility] [address]   [phone]            Sep 26, 2018  1:45 PM CDT   TMS TREATMENT with ME [facility] PROCEDURE ROOM   [facility] ([facility])    [address]  [facility] [address]   [phone]            Sep 27, 2018  1:45 PM CDT   TMS TREATMENT with ME [facility] PROCEDURE ROOM   [facility] ([facility])    [address]  [facility] [address]   [phone]            Sep 28, 2018  1:45 PM CDT   TMS TREATMENT with ME [facility] PROCEDURE ROOM   [facility] ([facility])    [address]  [facility] [address]   [phone]            Oct 01, 2018  1:45 PM CDT   TMS TREATMENT with ME [facility] PROCEDURE ROOM   [facility] ([facility])    [address]  [facility] [address]   [phone]            Oct 02, 2018  1:45 PM CDT   TMS TREATMENT with ME [facility] PROCEDURE ROOM   [facility] ([facility])    [address]  [facility] [address]   [phone]            Oct 03, 2018  1:45 PM CDT   TMS TREATMENT with ME [facility]MP PROCEDURE ROOM   [facility]  Psychiat[facility] ([facility])    [address]  [facility] [address]   [phone]            Oct 04, 2018  1:45 PM CDT   TMS TREATMENT with ME UMP PROCEDURE ROOM   [facility] ([facility])    [address]  [facility] [address] [phone]            Oct 05, 2018  1:45 PM CDT   TMS TREATMENT with ME UMP PROCEDURE ROOM   [facility] Psych[facility] ([facility])    [address]  [facility] [address] [phone]              Who to contact     Please call your clinic at [phone] to:    Ask questions about your health    Make or cancel appointments    Discuss your medicines    Learn about your test results    Speak to your doctor            Additional Information About Your Visit        [employer] Information     [employer] gives you secure access to your electronic health record. If you see a primary care provider, you can also send messages to your care team and make appointments. If you have questions, please call your primary care clinic.  If you do not have a primary care provider, please call [phone] and they will assist you.      [employer] is an electronic gateway that provides easy, online access to your medical records. With [employer], you can request a clinic appointment, read your test results, renew a prescription or communicate with your care team.     To access your existing account, please contact your [facility] or call [phone] for assistance.        Care EveryWhere ID     This is your Care EveryWhere ID. This could be used by other organizations to access your [place] medical records  [id number]        Your Vitals Were     Pulse                   79            Blood Pressure from Last 3 Encounters:   09/19/18 121/82   09/18/18 122/75   09/17/18 144/89    Weight from Last 3 Encounters:   09/12/18 75.7 kg (166 lb 12.8 oz)   07/10/18 76.8 kg (169 lb 6.4 oz)              We  Performed the Following     C TRANSCRANIAL MAGNETIC STIMULATION TREATMENT,DELIVERY/MANAGEMENT        Primary Care Provider Office Phone # Fax #    [facility] [phone] [address]  [facility] [address]        Equal Access to Services     BIANCA KHAN : Hadii faye ku hadkendyo Soomaali, waaxda luqadaha, qaybta kaalmada adeegyada, waxay lloydin haybilln priscila ruiztikitrey robertson. So [facility] [phone].    ATENCIÓN: Si habla español, tiene a cole disposición servicios gratuitos de asistencia lingüística. Llame al [phone].    We comply with applicable federal civil rights laws and [place] laws. We do not discriminate on the basis of race, color, national origin, age, disability, sex, sexual orientation, or gender identity.            Thank you!     Thank you for choosing [facility]  for your care. Our goal is always to provide you with excellent care. Hearing back from our patients is one way we can continue to improve our services. Please take a few minutes to complete the written survey that you may receive in the mail after your visit with us. Thank you!             Your Updated Medication List - Protect others around you: Learn how to safely use, store and throw away your medicines at www.disposemymeds.org.          This list is accurate as of 9/19/18  3:57 PM.  Always use your most recent med list.                   Brand Name Dispense Instructions for use Diagnosis    ALPRAZolam 0.5 MG tablet    XANAX     CANCEL PRIOR RX -- UPDATED DOSE INSTRUCTIONS -- Take one-half to one tab by mouth at bedtime as needed for anxiety.        amphetamine-dextroamphetamine 30 MG per 24 hr capsule    ADDERALL XR     Take 30 mg by mouth        buPROPion 150 MG 24 hr tablet    WELLBUTRIN XL     TAKE 2 TABLETS EVERY       MORNING        cholecalciferol 5000 units Caps capsule    vitamin D3          Fish Oil 435 MG Caps      Take 1,200 mg by mouth        ketorolac 60 MG/2ML  Soln injection    TORADOL     Inject 30-60 mg into the muscle        MULTIPLE VITAMINS-MINERALS PO           Octacosanol 1000-5 MCG-UNIT Tabs           PARoxetine 30 MG tablet    PAXIL     Take 60 mg by mouth

## 2018-09-19 NOTE — PROGRESS NOTES
[facility]  [address]  [place] [address]  TMS Procedure Note   Samira Romero MRN# [id number]  Age: 58 year old year old YOB: 1960    Pre-Procedure:  History and Physical: Reviewed in medical record  Consent Signed by: Samira Romero  On: 09/12/18    Clinical Narrative:  Pt tolerating treatment.    Indications for TMS:  MDD, recurrent, severe; 4+ medication trials (from 2+ classes) ineffective; Psychotherapy ineffective.     Pre-Procedure Diagnosis:  MDD, recurrent, severe 296.33    Treatment Hx:  Treatment number this series: 6  Total lifetime treatment number: 6    Allergies   Allergen Reactions     Codeine Nausea and Vomiting     Penicillins Rash      /82 (BP Location: Left arm, Patient Position: Sitting, Cuff Size: Adult Regular)  Pulse 79    Pause for the Cause  Right patient:  Yes  Right procedure/correct coil:  Yes; rTMS; cpt 48906; H1 coil.   Earplugs in place:  Yes    Procedure  Patient was seated in procedure chair. Identity and procedure was verified. Ear plugs were placed in ears and patient-specific cap was placed on head and tightened appropriately. Ruler locations were verified. Coil was placed at treatment location and stimulator was set to parameters described below. A test train was delivered and pt tolerated train. Given pt tolerance, 55 treatment trains were delivered. Pt tolerated procedure with some minimal right eyebrow movement and facial activation.    Motor Threshold Determination  Distance from nasion to inion: 36 cm  MT 1: 0 - 7- 14.5 @ 49% on 9/12/2018    Stimulation Parameters  Frequency: 18 Hz     Train duration: 2 sec  Total pulses delivered: 1980  Inter-train interval: 20 sec  Tx Loc: 0 - eyebrows  - 14.5  Energy: 58% (120% MT)  Trains: 55 trains    Psychiatric Examination  Appearance:  awake, alert and adequately groomed  Attitude:  cooperative  Eye Contact:  fair  Mood:  anxious and depressed  Affect:  mood congruent,  intensity is blunted, restricted range and reactive  Speech:  clear, coherent and normal prosody  Psychomotor Behavior:  no evidence of tardive dyskinesia, dystonia, or tics and intact station, gait and muscle tone  Thought Process:  logical, linear and goal oriented  Associations:  no loose associations  Thought Content:  no evidence of suicidal ideation or homicidal ideation and no evidence of psychotic thought  Insight:  good  Judgment:  intact  Oriented to:  time, person, and place  Attention Span and Concentration:  intact  Recent and Remote Memory:  intact  Language: Able to name objects, Able to repeat phrases and Able to read and write  Fund of Knowledge: appropriate  Muscle Strength and Tone: normal  Gait and Station: Normal    Post-Procedure Diagnosis:  MDD, recurrent, severe 296.33      Yanci Goss RN  [facility]    Patient reports having a rash on her back, states she has very sensitive skin.  Writer suggested that she make an appointment with her Primary Doctor as soon as she can to get rash checked out.    Plan   - Cont TMS       I didn t see the patient during/after treatment but remained available in the clinic during  treatment.    Alina Mcintyre MD  [facility]

## 2018-09-20 ASSESSMENT — PATIENT HEALTH QUESTIONNAIRE - PHQ9
SUM OF ALL RESPONSES TO PHQ QUESTIONS 1-9: 4
SUM OF ALL RESPONSES TO PHQ QUESTIONS 1-9: 3

## 2018-09-24 ENCOUNTER — OFFICE VISIT (OUTPATIENT)
Dept: PSYCHIATRY | Facility: CLINIC | Age: 58
End: 2018-09-24
Payer: COMMERCIAL

## 2018-09-24 VITALS — SYSTOLIC BLOOD PRESSURE: 117 MMHG | DIASTOLIC BLOOD PRESSURE: 82 MMHG | HEART RATE: 88 BPM

## 2018-09-24 DIAGNOSIS — F33.2 SEVERE EPISODE OF RECURRENT MAJOR DEPRESSIVE DISORDER, WITHOUT PSYCHOTIC FEATURES (H): Primary | ICD-10-CM

## 2018-09-24 NOTE — MR AVS SNAPSHOT
After Visit Summary   9/24/2018    Samira Romero    MRN: [id number]           Patient Information     Date Of Birth          1960        Visit Information        Provider Department      9/24/2018 1:45 PM ME [facility] PROCEDURE ROOM [facility] Psych[facility]        Today's Diagnoses     Severe episode of recurrent major depressive disorder, without psychotic features (H)    -  1       Follow-ups after your visit        Your next 10 appointments already scheduled     Oct 05, 2018  1:45 PM CDT   TMS TREATMENT with ME [facility] PROCEDURE ROOM   [facility] ([facility])    [address]  [facility] [address]   [phone]            Oct 08, 2018  1:45 PM CDT   TMS TREATMENT with ME [facility] PROCEDURE ROOM   [facility] ([facility])    [address]  [facility] [address]   [phone]            Oct 09, 2018  1:45 PM CDT   TMS TREATMENT with ME [facility] PROCEDURE ROOM   [facility] ([facility])    [address]  [facility] [address]   [phone]            Oct 10, 2018  1:45 PM CDT   TMS TREATMENT with ME [facility] PROCEDURE ROOM   [facility] ([facility])    [address]  [facility] [address]   [phone]            Oct 11, 2018  1:45 PM CDT   TMS TREATMENT with ME [facility] PROCEDURE ROOM   [facility] ([facility])    [address]  [facility] [address]   [phone]            Oct 12, 2018  1:45 PM CDT   TMS TREATMENT with ME [facility] PROCEDURE ROOM   [facility] ([facility])    [address]  [facility] [address]   [phone]            Oct 15, 2018  1:45 PM CDT   TMS TREATMENT with ME [facility] PROCEDURE ROOM   [facility] ([facility])    [address]  [facility] [address]   [phone]            Oct 16, 2018  1:45 PM CDT   TMS TREATMENT with ME [facility]MP PROCEDURE ROOM   [facility] ([facility])    [address]  [facility] [address] [phone]            Oct 17, 2018  1:45 PM CDT   TMS TREATMENT with ME UMP PROCEDURE ROOM   [facility] Psych[facility] ([facility])    [address]  [facility] [address] [phone]            Oct 18, 2018  1:45 PM CDT   TMS TREATMENT with ME UMP PROCEDURE ROOM   [facility] ([facility])    [address]  [facility] [address] [phone]              Who to contact     Please call your clinic at [phone] to:    Ask questions about your health    Make or cancel appointments    Discuss your medicines    Learn about your test results    Speak to your doctor            Additional Information About Your Visit        [email or web address] Information     [employer] gives you secure access to your electronic health record. If you see a primary care provider, you can also send messages to your care team and make appointments. If you have questions, please call your primary care clinic.  If you do not have a primary care provider, please call [phone] and they will assist you.      [employer] is an electronic gateway that provides easy, online access to your medical records. With [employer], you can request a clinic appointment, read your test results, renew a prescription or communicate with your care team.     To access your existing account, please contact your [facility] or call [phone] for assistance.        Care EveryWhere ID     This is your Care EveryWhere ID. This could be used by other organizations to access your [place] medical records  [id number]        Your Vitals Were     Pulse                   88            Blood Pressure from Last 3 Encounters:   10/03/18 132/82   10/02/18 131/82   10/01/18 126/76    Weight from Last 3 Encounters:   09/12/18 75.7 kg (166 lb 12.8 oz)   07/10/18 76.8 kg (169 lb 6.4 oz)              We  Performed the Following     C TRANSCRANIAL MAGNETIC STIMULATION TREATMENT,DELIVERY/MANAGEMENT        Primary Care Provider Office Phone # Fax #    [facility] [phone] [address]  [facility] [address]        Equal Access to Services     BIANCA KHAN : Hadii faye ku hadkendyo Soomaali, waaxda luqadaha, qaybta kaalmada adeegyada, waxay lloydin haybilln priscila ruiztikitrey robertson. So [facility] [phone].    ATENCIÓN: Si habla español, tiene a cole disposición servicios gratuitos de asistencia lingüística. Llame al [phone].    We comply with applicable federal civil rights laws and [place] laws. We do not discriminate on the basis of race, color, national origin, age, disability, sex, sexual orientation, or gender identity.            Thank you!     Thank you for choosing [facility]  for your care. Our goal is always to provide you with excellent care. Hearing back from our patients is one way we can continue to improve our services. Please take a few minutes to complete the written survey that you may receive in the mail after your visit with us. Thank you!             Your Updated Medication List - Protect others around you: Learn how to safely use, store and throw away your medicines at www.disposemymeds.org.          This list is accurate as of 9/24/18 11:59 PM.  Always use your most recent med list.                   Brand Name Dispense Instructions for use Diagnosis    ALPRAZolam 0.5 MG tablet    XANAX     CANCEL PRIOR RX -- UPDATED DOSE INSTRUCTIONS -- Take one-half to one tab by mouth at bedtime as needed for anxiety.        amphetamine-dextroamphetamine 30 MG per 24 hr capsule    ADDERALL XR     Take 30 mg by mouth        buPROPion 150 MG 24 hr tablet    WELLBUTRIN XL     TAKE 2 TABLETS EVERY       MORNING        cholecalciferol 5000 units Caps capsule    vitamin D3          Fish Oil 435 MG Caps      Take 1,200 mg by mouth        ketorolac 60 MG/2ML  Soln injection    TORADOL     Inject 30-60 mg into the muscle        MULTIPLE VITAMINS-MINERALS PO           Octacosanol 1000-5 MCG-UNIT Tabs           PARoxetine 30 MG tablet    PAXIL     Take 60 mg by mouth

## 2018-09-24 NOTE — PROGRESS NOTES
[facility]MS [facility]  [address]  [place] [address]  TMS Procedure Note   Samira Romero MRN# [id number]  Age: 58 year old year old YOB: 1960    Pre-Procedure:  History and Physical: Reviewed in medical record  Consent Signed by: Samira Romero  On: 09/12/18    Clinical Narrative:  Pt tolerating treatment.    Indications for TMS:  MDD, recurrent, severe; 4+ medication trials (from 2+ classes) ineffective; Psychotherapy ineffective.     Pre-Procedure Diagnosis:  MDD, recurrent, severe 296.33    Treatment Hx:  Treatment number this series: 7  Total lifetime treatment number: 7    Allergies   Allergen Reactions     Codeine Nausea and Vomiting     Penicillins Rash      /82 (BP Location: Left arm, Patient Position: Sitting, Cuff Size: Adult Regular)  Pulse 88    Pause for the Cause  Right patient:  Yes  Right procedure/correct coil:  Yes; rTMS; cpt 33566; H1 coil.   Earplugs in place:  Yes    Procedure  Patient was seated in procedure chair. Identity and procedure was verified. Ear plugs were placed in ears and patient-specific cap was placed on head and tightened appropriately. Ruler locations were verified. Coil was placed at treatment location and stimulator was set to parameters described below. A test train was delivered and pt tolerated train. Given pt tolerance, 55 treatment trains were delivered. Pt tolerated procedure with some minimal right eyebrow movement and facial activation.    Motor Threshold Determination  Distance from nasion to inion: 36 cm  MT 1: 0 - 7- 14.5 @ 49% on 9/12/2018    Stimulation Parameters  Frequency: 18 Hz     Train duration: 2 sec  Total pulses delivered: 1980  Inter-train interval: 20 sec  Tx Loc: 0 - eyebrows  - 14.5  Energy: 58% (120% MT)  Trains: 55 trains    Psychiatric Examination  Appearance:  awake, alert and adequately groomed  Attitude:  cooperative  Eye Contact:  fair  Mood:  anxious and depressed  Affect:  mood congruent,  intensity is blunted, restricted range and reactive  Speech:  clear, coherent and normal prosody  Psychomotor Behavior:  no evidence of tardive dyskinesia, dystonia, or tics and intact station, gait and muscle tone  Thought Process:  logical, linear and goal oriented  Associations:  no loose associations  Thought Content:  no evidence of suicidal ideation or homicidal ideation and no evidence of psychotic thought  Insight:  good  Judgment:  intact  Oriented to:  time, person, and place  Attention Span and Concentration:  intact  Recent and Remote Memory:  intact  Language: Able to name objects, Able to repeat phrases and Able to read and write  Fund of Knowledge: appropriate  Muscle Strength and Tone: normal  Gait and Station: Normal    Post-Procedure Diagnosis:  MDD, recurrent, severe 296.33      Yanci Goss RN  [facility]        Plan   - Cont TMS       I didn t see the patient during/after treatment but remained available in the clinic during  treatment.    JONATHAN Snow MD  [facility]

## 2018-09-25 ENCOUNTER — OFFICE VISIT (OUTPATIENT)
Dept: PSYCHIATRY | Facility: CLINIC | Age: 58
End: 2018-09-25
Payer: COMMERCIAL

## 2018-09-25 VITALS — SYSTOLIC BLOOD PRESSURE: 133 MMHG | DIASTOLIC BLOOD PRESSURE: 78 MMHG | HEART RATE: 96 BPM

## 2018-09-25 DIAGNOSIS — F33.2 SEVERE EPISODE OF RECURRENT MAJOR DEPRESSIVE DISORDER, WITHOUT PSYCHOTIC FEATURES (H): Primary | ICD-10-CM

## 2018-09-25 ASSESSMENT — PATIENT HEALTH QUESTIONNAIRE - PHQ9: SUM OF ALL RESPONSES TO PHQ QUESTIONS 1-9: 2

## 2018-09-25 NOTE — MR AVS SNAPSHOT
After Visit Summary   9/25/2018    Samira Romero    MRN: [id number]           Patient Information     Date Of Birth          1960        Visit Information        Provider Department      9/25/2018 1:45 PM [facility] PROCEDURE ROOM [facility]        Today's Diagnoses     Severe episode of recurrent major depressive disorder, without psychotic features (H)    -  1       Follow-ups after your visit        Your next 10 appointments already scheduled     Oct 05, 2018  1:45 PM CDT   TMS TREATMENT with ME [facility] PROCEDURE ROOM   [facility] ([facility])    [address]  [facility] [address]   [phone]            Oct 08, 2018  1:45 PM CDT   TMS TREATMENT with ME [facility] PROCEDURE ROOM   [facility] ([facility])    [address]  [facility] [address]   [phone]            Oct 09, 2018  1:45 PM CDT   TMS TREATMENT with ME [facility] PROCEDURE ROOM   [facility] ([facility])    [address]  [facility] [address]   [phone]            Oct 10, 2018  1:45 PM CDT   TMS TREATMENT with ME [facility] PROCEDURE ROOM   [facility] ([facility])    [address]  [facility] [address]   [phone]            Oct 11, 2018  1:45 PM CDT   TMS TREATMENT with ME [facility] PROCEDURE ROOM   [facility] ([facility])    [address]  [facility] [address]   [phone]            Oct 12, 2018  1:45 PM CDT   TMS TREATMENT with ME [facility] PROCEDURE ROOM   [facility] ([facility])    [address]  [facility] [address]   [phone]            Oct 15, 2018  1:45 PM CDT   TMS TREATMENT with ME [facility] PROCEDURE ROOM   [facility] ([facility])    [address]  [facility] [address]   [phone]            Oct 16, 2018  1:45 PM CDT   TMS TREATMENT with ME [facility]MP PROCEDURE ROOM   [facility] ([facility])    [address]  [facility] [address] [phone]            Oct 17, 2018  1:45 PM CDT   TMS TREATMENT with ME UMP PROCEDURE ROOM   [facility] Psychiatry ([facility])    [address]  [facility] [address] [phone]            Oct 18, 2018  1:45 PM CDT   TMS TREATMENT with ME UMP PROCEDURE ROOM   [facility] Psychiat[facility] ([facility])    [address]  [facility] [address] [phone]              Who to contact     Please call your clinic at [phone] to:    Ask questions about your health    Make or cancel appointments    Discuss your medicines    Learn about your test results    Speak to your doctor            Additional Information About Your Visit        [employer] Your [employer] Information     [employer] gives you secure access to your electronic health record. If you see a primary care provider, you can also send messages to your care team and make appointments. If you have questions, please call your primary care clinic.  If you do not have a primary care provider, please call [phone] and they will assist you.      [employer] is an electronic gateway that provides easy, online access to your medical records. With [employer], you can request a clinic appointment, read your test results, renew a prescription or communicate with your care team.     To access your existing account, please contact your [facility] or call [phone] for assistance.        Care EveryWhere ID     This is your Care EveryWhere ID. This could be used by other organizations to access your [place] medical records  [id number]        Your Vitals Were     Pulse                   96            Blood Pressure from Last 3 Encounters:   10/03/18 132/82   10/02/18 131/82   10/01/18 126/76    Weight from Last 3 Encounters:   09/12/18 75.7 kg (166 lb 12.8 oz)   07/10/18 76.8 kg (169 lb 6.4 oz)              We  Performed the Following     C TRANSCRANIAL MAGNETIC STIMULATION TREATMENT,DELIVERY/MANAGEMENT        Primary Care Provider Office Phone # Fax #    [facility] [phone] [address]  [facility] [address]        Equal Access to Services     BIANCA KHAN : Hadii faye ku hadkendyo Soomaali, waaxda luqadaha, qaybta kaalmada adeegyada, waxay lloydin haybilln priscila ruiztikitrey robertson. So [facility] [phone].    ATENCIÓN: Si habla español, tiene a cole disposición servicios gratuitos de asistencia lingüística. Llame al [phone].    We comply with applicable federal civil rights laws and [place] laws. We do not discriminate on the basis of race, color, national origin, age, disability, sex, sexual orientation, or gender identity.            Thank you!     Thank you for choosing [facility] PSY[facility]  for your care. Our goal is always to provide you with excellent care. Hearing back from our patients is one way we can continue to improve our services. Please take a few minutes to complete the written survey that you may receive in the mail after your visit with us. Thank you!             Your Updated Medication List - Protect others around you: Learn how to safely use, store and throw away your medicines at www.disposemymeds.org.          This list is accurate as of 9/25/18 11:59 PM.  Always use your most recent med list.                   Brand Name Dispense Instructions for use Diagnosis    ALPRAZolam 0.5 MG tablet    XANAX     CANCEL PRIOR RX -- UPDATED DOSE INSTRUCTIONS -- Take one-half to one tab by mouth at bedtime as needed for anxiety.        amphetamine-dextroamphetamine 30 MG per 24 hr capsule    ADDERALL XR     Take 30 mg by mouth        buPROPion 150 MG 24 hr tablet    WELLBUTRIN XL     TAKE 2 TABLETS EVERY       MORNING        cholecalciferol 5000 units Caps capsule    vitamin D3          Fish Oil 435 MG Caps      Take 1,200 mg by mouth        ketorolac 60 MG/2ML  Soln injection    TORADOL     Inject 30-60 mg into the muscle        MULTIPLE VITAMINS-MINERALS PO           Octacosanol 1000-5 MCG-UNIT Tabs           PARoxetine 30 MG tablet    PAXIL     Take 60 mg by mouth

## 2018-09-25 NOTE — PROGRESS NOTES
[facility] TMS Program  [address]  [place] [address]  TMS Procedure Note   Samira Romero MRN# [id number]  Age: 58 year old year old YOB: 1960    Pre-Procedure:  History and Physical: Reviewed in medical record  Consent Signed by: Samira Romero  On: 09/12/18    Clinical Narrative:  Pt tolerating treatment.    Indications for TMS:  MDD, recurrent, severe; 4+ medication trials (from 2+ classes) ineffective; Psychotherapy ineffective.     Pre-Procedure Diagnosis:  MDD, recurrent, severe 296.33    Treatment Hx:  Treatment number this series: 8  Total lifetime treatment number: 8    Allergies   Allergen Reactions     Codeine Nausea and Vomiting     Penicillins Rash      /78 (BP Location: Left arm, Patient Position: Sitting, Cuff Size: Adult Regular)  Pulse 96    Pause for the Cause  Right patient:  Yes  Right procedure/correct coil:  Yes; rTMS; cpt 77300; H1 coil.   Earplugs in place:  Yes    Procedure  Patient was seated in procedure chair. Identity and procedure was verified. Ear plugs were placed in ears and patient-specific cap was placed on head and tightened appropriately. Ruler locations were verified. Coil was placed at treatment location and stimulator was set to parameters described below. A test train was delivered and pt tolerated train. Given pt tolerance, 55 treatment trains were delivered. Pt tolerated procedure with some minimal right eyebrow movement and facial activation.    Motor Threshold Determination  Distance from nasion to inion: 36 cm  MT 1: 0 - 7- 14.5 @ 49% on 9/12/2018    Stimulation Parameters  Frequency: 18 Hz     Train duration: 2 sec  Total pulses delivered: 1980  Inter-train interval: 20 sec  Tx Loc: 0 - eyebrows  - 14.5  Energy: 58% (120% MT)  Trains: 55 trains    Psychiatric Examination  Appearance:  awake, alert and adequately groomed  Attitude:  cooperative  Eye Contact:  fair  Mood:  anxious and depressed  Affect:  mood congruent,  intensity is blunted, restricted range and reactive  Speech:  clear, coherent and normal prosody  Psychomotor Behavior:  no evidence of tardive dyskinesia, dystonia, or tics and intact station, gait and muscle tone  Thought Process:  logical, linear and goal oriented  Associations:  no loose associations  Thought Content:  no evidence of suicidal ideation or homicidal ideation and no evidence of psychotic thought  Insight:  good  Judgment:  intact  Oriented to:  time, person, and place  Attention Span and Concentration:  intact  Recent and Remote Memory:  intact  Language: Able to name objects, Able to repeat phrases and Able to read and write  Fund of Knowledge: appropriate  Muscle Strength and Tone: normal  Gait and Station: Normal    Post-Procedure Diagnosis:  MDD, recurrent, severe 296.33      Yanci Goss RN  [facility] Neuromod[facility]        Plan   - Cont TMS       I didn t see the patient during/after treatment but remained available in the clinic during  treatment.    JONATHAN Snow MD  [facility] Neuromod[facility]

## 2018-09-26 ENCOUNTER — OFFICE VISIT (OUTPATIENT)
Dept: PSYCHIATRY | Facility: CLINIC | Age: 58
End: 2018-09-26
Payer: COMMERCIAL

## 2018-09-26 VITALS — HEART RATE: 101 BPM | SYSTOLIC BLOOD PRESSURE: 141 MMHG | DIASTOLIC BLOOD PRESSURE: 84 MMHG

## 2018-09-26 DIAGNOSIS — F33.2 SEVERE EPISODE OF RECURRENT MAJOR DEPRESSIVE DISORDER, WITHOUT PSYCHOTIC FEATURES (H): Primary | ICD-10-CM

## 2018-09-26 ASSESSMENT — PATIENT HEALTH QUESTIONNAIRE - PHQ9: SUM OF ALL RESPONSES TO PHQ QUESTIONS 1-9: 2

## 2018-09-26 NOTE — MR AVS SNAPSHOT
After Visit Summary   9/26/2018    Samira Romero    MRN: [id number]           Patient Information     Date Of Birth          1960        Visit Information        Provider Department      9/26/2018 1:45 PM ME [facility] PROCEDURE ROOM [facility] Psych[facility]        Today's Diagnoses     Severe episode of recurrent major depressive disorder, without psychotic features (H)    -  1       Follow-ups after your visit        Your next 10 appointments already scheduled     Sep 27, 2018  1:45 PM CDT   TMS TREATMENT with ME [facility] PROCEDURE ROOM   [facility] ([facility])    [address]  [facility] [place] [facility] [address]   [phone]            Sep 28, 2018  1:45 PM CDT   TMS TREATMENT with ME [facility] PROCEDURE ROOM   [facility] ([facility])    [address]  [facility] [place] [facility] [address]   [phone]            Oct 01, 2018  1:45 PM CDT   TMS TREATMENT with ME [facility] PROCEDURE ROOM   [facility] ([facility])    [address]  [facility] [address]   [phone]            Oct 02, 2018  1:45 PM CDT   TMS TREATMENT with ME [facility] PROCEDURE ROOM   [facility] ([facility])    [address]  [facility] [address]   [phone]            Oct 03, 2018  1:45 PM CDT   TMS TREATMENT with ME [facility] PROCEDURE ROOM   [facility] ([facility])    [address]  [facility] [address]   [phone]            Oct 04, 2018  1:45 PM CDT   TMS TREATMENT with ME [facility] PROCEDURE ROOM   [facility] ([facility])    [address]  [facility] [address]   [phone]            Oct 05, 2018  1:45 PM CDT   TMS TREATMENT with ME [facility] PROCEDURE ROOM   [facility] ([facility])    [address]  [facility] [address]   [phone]            Oct 08, 2018  1:45 PM CDT   TMS TREATMENT with ME UMP PROCEDURE ROOM   [facility]  Psychiat[facility] ([facility])    [address]  [facility] [address]   [phone]            Oct 09, 2018  1:45 PM CDT   TMS TREATMENT with ME UMP PROCEDURE ROOM   [facility] Psychiatry ([facility])    [address]  [facility] [address] [phone]            Oct 10, 2018  1:45 PM CDT   TMS TREATMENT with ME UMP PROCEDURE ROOM   [facility] Psychiatry ([facility])    [address]  [facility] [address] [phone]              Who to contact     Please call your clinic at [phone] to:    Ask questions about your health    Make or cancel appointments    Discuss your medicines    Learn about your test results    Speak to your doctor            Additional Information About Your Visit        [employer] Information     [email or web address] gives you secure access to your electronic health record. If you see a primary care provider, you can also send messages to your care team and make appointments. If you have questions, please call your primary care clinic.  If you do not have a primary care provider, please call [phone] and they will assist you.      [email or web address] is an electronic gateway that provides easy, online access to your medical records. With [email or web address], you can request a clinic appointment, read your test results, renew a prescription or communicate with your care team.     To access your existing account, please contact your [facility] or call [phone] for assistance.        Care EveryWhere ID     This is your Care EveryWhere ID. This could be used by other organizations to access your [place] medical records  [id number]        Your Vitals Were     Pulse                   101            Blood Pressure from Last 3 Encounters:   09/26/18 141/84   09/25/18 133/78   09/24/18 117/82    Weight from Last 3 Encounters:   09/12/18 75.7 kg (166 lb 12.8 oz)   07/10/18 76.8 kg (169 lb 6.4 oz)               We Performed the Following     C TRANSCRANIAL MAGNETIC STIMULATION TREATMENT,DELIVERY/MANAGEMENT        Primary Care Provider Office Phone # Fax #    [facility] [phone] [address] [facility] [address]        Equal Access to Services     BIANCA KHAN : Hadii aad ku hadkendyo Soomaali, waaxda luqadaha, qaybta kaalmada adeegyada, waxay lloydin haybilln priscila ruiztikitrey robertson. So [facility] [phone].    ATENCIÓN: Si habla español, tiene a cole disposición servicios gratuitos de asistencia lingüística. Llame al [phone].    We comply with applicable federal civil rights laws and [place] laws. We do not discriminate on the basis of race, color, national origin, age, disability, sex, sexual orientation, or gender identity.            Thank you!     Thank you for choosing [facility] PSYCHIATRY  for your care. Our goal is always to provide you with excellent care. Hearing back from our patients is one way we can continue to improve our services. Please take a few minutes to complete the written survey that you may receive in the mail after your visit with us. Thank you!             Your Updated Medication List - Protect others around you: Learn how to safely use, store and throw away your medicines at www.disposemymeds.org.          This list is accurate as of 9/26/18  3:14 PM.  Always use your most recent med list.                   Brand Name Dispense Instructions for use Diagnosis    ALPRAZolam 0.5 MG tablet    XANAX     CANCEL PRIOR RX -- UPDATED DOSE INSTRUCTIONS -- Take one-half to one tab by mouth at bedtime as needed for anxiety.        amphetamine-dextroamphetamine 30 MG per 24 hr capsule    ADDERALL XR     Take 30 mg by mouth        buPROPion 150 MG 24 hr tablet    WELLBUTRIN XL     TAKE 2 TABLETS EVERY       MORNING        cholecalciferol 5000 units Caps capsule    vitamin D3          Fish Oil 435 MG Caps      Take 1,200 mg by mouth        ketorolac 60  MG/2ML Soln injection    TORADOL     Inject 30-60 mg into the muscle        MULTIPLE VITAMINS-MINERALS PO           Octacosanol 1000-5 MCG-UNIT Tabs           PARoxetine 30 MG tablet    PAXIL     Take 60 mg by mouth

## 2018-09-26 NOTE — PROGRESS NOTES
[facility] TMS Program  [address]  [place] [address]  TMS Procedure Note   Samira Romero MRN# [id number]  Age: 58 year old year old YOB: 1960    Pre-Procedure:  History and Physical: Reviewed in medical record  Consent Signed by: Samira Romero  On: 09/12/18    Clinical Narrative:  Pt tolerating treatment.    Indications for TMS:  MDD, recurrent, severe; 4+ medication trials (from 2+ classes) ineffective; Psychotherapy ineffective.     Pre-Procedure Diagnosis:  MDD, recurrent, severe 296.33    Treatment Hx:  Treatment number this series: 9  Total lifetime treatment number: 9    Allergies   Allergen Reactions     Codeine Nausea and Vomiting     Penicillins Rash      /84 (BP Location: Left arm, Patient Position: Sitting, Cuff Size: Adult Regular)  Pulse 101    Pause for the Cause  Right patient:  Yes  Right procedure/correct coil:  Yes; rTMS; cpt 30710; H1 coil.   Earplugs in place:  Yes    Procedure  Patient was seated in procedure chair. Identity and procedure was verified. Ear plugs were placed in ears and patient-specific cap was placed on head and tightened appropriately. Ruler locations were verified. Coil was placed at treatment location and stimulator was set to parameters described below. A test train was delivered and pt tolerated train. Given pt tolerance, 55 treatment trains were delivered. Pt tolerated procedure with some minimal right eyebrow movement and facial activation.    Motor Threshold Determination  Distance from nasion to inion: 36 cm  MT 1: 0 - 7- 14.5 @ 49% on 9/12/2018    Stimulation Parameters  Frequency: 18 Hz     Train duration: 2 sec  Total pulses delivered: 1980  Inter-train interval: 20 sec  Tx Loc: 0 - eyebrows  - 14.5  Energy: 58% (120% MT)  Trains: 55 trains    Psychiatric Examination  Appearance:  awake, alert and adequately groomed  Attitude:  cooperative  Eye Contact:  fair  Mood:  anxious and depressed  Affect:  mood congruent,  intensity is blunted, restricted range and reactive  Speech:  clear, coherent and normal prosody  Psychomotor Behavior:  no evidence of tardive dyskinesia, dystonia, or tics and intact station, gait and muscle tone  Thought Process:  logical, linear and goal oriented  Associations:  no loose associations  Thought Content:  no evidence of suicidal ideation or homicidal ideation and no evidence of psychotic thought  Insight:  good  Judgment:  intact  Oriented to:  time, person, and place  Attention Span and Concentration:  intact  Recent and Remote Memory:  intact  Language: Able to name objects, Able to repeat phrases and Able to read and write  Fund of Knowledge: appropriate  Muscle Strength and Tone: normal  Gait and Station: Normal    Post-Procedure Diagnosis:  MDD, recurrent, severe 296.33      Yanci Goss RN  [facility]od[facility]        Plan   - Cont TMS  -ReMT tomorrow (Thursday)       I didn t see the patient during/after treatment but remained available in the clinic during  treatment.    Alina Mcintyre MD  [facility]

## 2018-09-27 ENCOUNTER — OFFICE VISIT (OUTPATIENT)
Dept: PSYCHIATRY | Facility: CLINIC | Age: 58
End: 2018-09-27
Payer: COMMERCIAL

## 2018-09-27 VITALS — HEART RATE: 96 BPM | SYSTOLIC BLOOD PRESSURE: 113 MMHG | DIASTOLIC BLOOD PRESSURE: 88 MMHG

## 2018-09-27 DIAGNOSIS — F33.2 SEVERE EPISODE OF RECURRENT MAJOR DEPRESSIVE DISORDER, WITHOUT PSYCHOTIC FEATURES (H): Primary | ICD-10-CM

## 2018-09-27 ASSESSMENT — PATIENT HEALTH QUESTIONNAIRE - PHQ9: SUM OF ALL RESPONSES TO PHQ QUESTIONS 1-9: 2

## 2018-09-27 NOTE — MR AVS SNAPSHOT
After Visit Summary   9/27/2018    Samira Romero    MRN: [id number]           Patient Information     Date Of Birth          1960        Visit Information        Provider Department      9/27/2018 1:45 PM ME [facility] PROCEDURE ROOM [facility]        Today's Diagnoses     Severe episode of recurrent major depressive disorder, without psychotic features (H)    -  1       Follow-ups after your visit        Your next 10 appointments already scheduled     Oct 01, 2018  1:45 PM CDT   TMS TREATMENT with ME [facility] PROCEDURE ROOM   [facility] ([facility])    [address]  [facility] [address]   [phone]            Oct 02, 2018  1:45 PM CDT   TMS TREATMENT with ME [facility] PROCEDURE ROOM   [facility] ([facility])    [address]  [facility] [address]   [phone]            Oct 03, 2018  1:45 PM CDT   TMS TREATMENT with ME [facility] PROCEDURE ROOM   [facility] ([facility])    [address]  [facility] [address]   [phone]            Oct 04, 2018  1:45 PM CDT   TMS TREATMENT with ME [facility] PROCEDURE ROOM   [facility] ([facility])    [address]  [facility] [address]   [phone]            Oct 05, 2018  1:45 PM CDT   TMS TREATMENT with ME [facility] PROCEDURE ROOM   [facility] ([facility])    [address]  [facility] [address]   [phone]            Oct 08, 2018  1:45 PM CDT   TMS TREATMENT with ME [facility] PROCEDURE ROOM   [facility] ([facility])    [address]  [facility] [address]   [phone]            Oct 09, 2018  1:45 PM CDT   TMS TREATMENT with ME [facility]MP PROCEDURE ROOM   [facility] ([facility])    [address]  [facility] [address]   [phone]            Oct 10, 2018  1:45 PM CDT   TMS TREATMENT with ME UMP PROCEDURE ROOM   [facility]iat[facility] ([facility])    [address]  [facility] [address]   [phone]            Oct 11, 2018  1:45 PM CDT   TMS TREATMENT with ME UMP PROCEDURE ROOM   [facility] Psychiat[facility] ([facility])    [address]  [facility] [address] [phone]            Oct 12, 2018  1:45 PM CDT   TMS TREATMENT with ME UMP PROCEDURE ROOM   [facility] Psychiat[facility] ([facility])    [address]  [facility] [address] [phone]              Who to contact     Please call your clinic at [phone] to:    Ask questions about your health    Make or cancel appointments    Discuss your medicines    Learn about your test results    Speak to your doctor            Additional Information About Your Visit        [employer] Information     [employer] gives you secure access to your electronic health record. If you see a primary care provider, you can also send messages to your care team and make appointments. If you have questions, please call your primary care clinic.  If you do not have a primary care provider, please call [phone] and they will assist you.      [employer] is an electronic gateway that provides easy, online access to your medical records. With [employer], you can request a clinic appointment, read your test results, renew a prescription or communicate with your care team.     To access your existing account, please contact your [facility] or call [phone] for assistance.        Care EveryWhere ID     This is your Care EveryWhere ID. This could be used by other organizations to access your [place] medical records  [id number]        Your Vitals Were     Pulse                   96            Blood Pressure from Last 3 Encounters:   09/28/18 139/81   09/27/18 113/88   09/26/18 141/84    Weight from Last 3 Encounters:   09/12/18 75.7 kg (166 lb 12.8 oz)   07/10/18 76.8 kg (169 lb 6.4 oz)              We  Performed the Following     C REPET TMS TX SUBSEQ MOTR THRESHLD W/DELIV & MNGT        Primary Care Provider Office Phone # Fax #    [facility] [phone] [address] [facility] [address]        Equal Access to Services     BIANCA KHAN : Hadii aad ku hadasho Soomaali, waaxda luqadaha, qaybta kaalmada adeegyada, waxay idiin hayaan adeeg nick laele . So [facility] [phone].    ATENCIÓN: Si habla español, tiene a cole disposición servicios gratuitos de asistencia lingüística. [facility] [phone].    We comply with applicable federal civil rights laws and [place] laws. We do not discriminate on the basis of race, color, national origin, age, disability, sex, sexual orientation, or gender identity.            Thank you!     Thank you for choosing [facility]  for your care. Our goal is always to provide you with excellent care. Hearing back from our patients is one way we can continue to improve our services. Please take a few minutes to complete the written survey that you may receive in the mail after your visit with us. Thank you!             Your Updated Medication List - Protect others around you: Learn how to safely use, store and throw away your medicines at www.disposemymeds.org.          This list is accurate as of 9/27/18 11:59 PM.  Always use your most recent med list.                   Brand Name Dispense Instructions for use Diagnosis    ALPRAZolam 0.5 MG tablet    XANAX     CANCEL PRIOR RX -- UPDATED DOSE INSTRUCTIONS -- Take one-half to one tab by mouth at bedtime as needed for anxiety.        amphetamine-dextroamphetamine 30 MG per 24 hr capsule    ADDERALL XR     Take 30 mg by mouth        buPROPion 150 MG 24 hr tablet    WELLBUTRIN XL     TAKE 2 TABLETS EVERY       MORNING        cholecalciferol 5000 units Caps capsule    vitamin D3          Fish Oil 435 MG Caps      Take 1,200 mg by mouth        ketorolac 60 MG/2ML Soln injection     TORADOL     Inject 30-60 mg into the muscle        MULTIPLE VITAMINS-MINERALS PO           Octacosanol 1000-5 MCG-UNIT Tabs           PARoxetine 30 MG tablet    PAXIL     Take 60 mg by mouth

## 2018-09-27 NOTE — PROGRESS NOTES
[facility] [place] [facility] TMS Program  [address]  [place] [address]  TMS Procedure Note   Samira Romero MRN# [id number]  Age: 58 year old year old YOB: 1960    Pre-Procedure:  History and Physical: Reviewed in medical record  Consent Signed by: Samira Romero  On: 09/12/18    Clinical Narrative:  Pt tolerating treatment. Patient report having more energy.  ReMT with right hand facing up.     Indications for TMS:  MDD, recurrent, severe; 4+ medication trials (from 2+ classes) ineffective; Psychotherapy ineffective.     Pre-Procedure Diagnosis:  MDD, recurrent, severe 296.33    Treatment Hx:  Treatment number this series: 10  Total lifetime treatment number: 10    Allergies   Allergen Reactions     Codeine Nausea and Vomiting     Penicillins Rash      /88 (BP Location: Right arm, Patient Position: Sitting, Cuff Size: Adult Regular)  Pulse 96    Pause for the Cause  Right patient:  Yes  Right procedure/correct coil:  Yes; rTMS; cpt 53145; H1 coil.   Earplugs in place:  Yes    Procedure  Patient was seated in procedure chair. Identity and procedure was verified. Ear plugs were placed in ears and patient-specific cap was placed on head and tightened appropriately. Ruler locations were verified. Coil was placed at initial MT location and stimulator was set to initial MT. MT was retested and found as described below. Coil was placed at treatment location and stimulator was set to stimulation parameters detailed below. A test train was delivered and pt tolerated train fine. Given pt tolerance, 55 trains were delivered. Pt tolerated procedure with some minimal right eyebrow movement and facial activation.    Motor Threshold Determination  Distance from nasion to inion: 36 cm  MT 1: 0 - 7- 14.5 @ 49% on 9/12/2018  MT 2: 0 - 7- 14.5 @ 49% on 9/27/2018      Stimulation Parameters  Frequency: 18 Hz     Train duration: 2 sec  Total pulses delivered: 1980  Inter-train interval: 20 sec  Tx Loc: 0  - eyebrows  - 14.5  Energy: 58% (120% MT)  Trains: 55 trains    Psychiatric Examination  Appearance:  awake, alert and adequately groomed  Attitude:  cooperative  Eye Contact:  fair  Mood:  anxious and depressed  Affect:  mood congruent, intensity is blunted, restricted range and reactive  Speech:  clear, coherent and normal prosody  Psychomotor Behavior:  no evidence of tardive dyskinesia, dystonia, or tics and intact station, gait and muscle tone  Thought Process:  logical, linear and goal oriented  Associations:  no loose associations  Thought Content:  no evidence of suicidal ideation or homicidal ideation and no evidence of psychotic thought  Insight:  good  Judgment:  intact  Oriented to:  time, person, and place  Attention Span and Concentration:  intact  Recent and Remote Memory:  intact  Language: Able to name objects, Able to repeat phrases and Able to read and write  Fund of Knowledge: appropriate  Muscle Strength and Tone: normal  Gait and Station: Normal    Post-Procedure Diagnosis:  MDD, recurrent, severe 296.33      Cleve Garcia  [facility] Neuromod[facility]      Plan   - Cont TMS    I completed repeat determination of the pt's motor threshold during the visit today. I was available in the clinic throughout the treatment.    Cristine Snow MD  [facility] Neuromod[facility]

## 2018-09-28 ENCOUNTER — OFFICE VISIT (OUTPATIENT)
Dept: PSYCHIATRY | Facility: CLINIC | Age: 58
End: 2018-09-28
Payer: COMMERCIAL

## 2018-09-28 VITALS — SYSTOLIC BLOOD PRESSURE: 139 MMHG | DIASTOLIC BLOOD PRESSURE: 81 MMHG | HEART RATE: 90 BPM

## 2018-09-28 DIAGNOSIS — F33.2 SEVERE EPISODE OF RECURRENT MAJOR DEPRESSIVE DISORDER, WITHOUT PSYCHOTIC FEATURES (H): Primary | ICD-10-CM

## 2018-09-28 ASSESSMENT — PATIENT HEALTH QUESTIONNAIRE - PHQ9: SUM OF ALL RESPONSES TO PHQ QUESTIONS 1-9: 2

## 2018-09-28 NOTE — PROGRESS NOTES
[facility] TMS Program  [address]  [place] [address]  TMS Procedure Note   Samira Romero MRN# [id number]  Age: 58 year old year old YOB: 1960    Pre-Procedure:  History and Physical: Reviewed in medical record  Consent Signed by: Samira Romero  On: 09/12/18    Clinical Narrative:  Pt tolerating treatment. We attempted to tilt the chair but patient preferred it straight up. IDSSR=10    Indications for TMS:  MDD, recurrent, severe; 4+ medication trials (from 2+ classes) ineffective; Psychotherapy ineffective.     Pre-Procedure Diagnosis:  MDD, recurrent, severe 296.33    Treatment Hx:  Treatment number this series: 11  Total lifetime treatment number: 11    Allergies   Allergen Reactions     Codeine Nausea and Vomiting     Penicillins Rash      /81 (BP Location: Right arm, Patient Position: Chair, Cuff Size: Adult Regular)  Pulse 90    Pause for the Cause  Right patient:  Yes  Right procedure/correct coil:  Yes; rTMS; cpt 92561; H1 coil.   Earplugs in place:  Yes    Procedure  Patient was seated in procedure chair. Identity and procedure was verified. Ear plugs were placed in ears and patient-specific cap was placed on head and tightened appropriately. Ruler locations were verified. Coil was placed at treatment location and stimulator was set to parameters described below. A test train was delivered and pt tolerated train. Given pt tolerance, 55 treatment trains were delivered. Pt tolerated procedure  some minimal right eyebrow movement and facial activation.      Motor Threshold Determination  Distance from nasion to inion: 36 cm  MT 1: 0 - 7- 14.5 @ 49% on 9/12/2018  MT 2: 0 - 7- 14.5 @ 49% on 9/27/2018      Stimulation Parameters  Frequency: 18 Hz     Train duration: 2 sec  Total pulses delivered: 1980  Inter-train interval: 20 sec  Tx Loc: 0 - eyebrows  - 14.5  Energy: 58% (120% MT)  Trains: 55 trains    Psychiatric Examination  Appearance:  awake, alert and  adequately groomed  Attitude:  cooperative  Eye Contact:  fair  Mood:  anxious and depressed  Affect:  mood congruent, intensity is blunted, restricted range and reactive  Speech:  clear, coherent and normal prosody  Psychomotor Behavior:  no evidence of tardive dyskinesia, dystonia, or tics and intact station, gait and muscle tone  Thought Process:  logical, linear and goal oriented  Associations:  no loose associations  Thought Content:  no evidence of suicidal ideation or homicidal ideation and no evidence of psychotic thought  Insight:  good  Judgment:  intact  Oriented to:  time, person, and place  Attention Span and Concentration:  intact  Recent and Remote Memory:  intact  Language: Able to name objects, Able to repeat phrases and Able to read and write  Fund of Knowledge: appropriate  Muscle Strength and Tone: normal  Gait and Station: Normal    Post-Procedure Diagnosis:  MDD, recurrent, severe 296.33      Quinton Hendricks LPN  [facility] Neuromodulation      Plan   - Cont TMS       I didn t see the patient during/after treatment but remained available in the clinic during  treatment.    JONATHAN Snow MD  [facility] Neuromod[facility]

## 2018-09-28 NOTE — MR AVS SNAPSHOT
After Visit Summary   9/28/2018    Samira Romero    MRN: [id number]           Patient Information     Date Of Birth          1960        Visit Information        Provider Department      9/28/2018 1:45 PM ME [facility] PROCEDURE ROOM [facility] Psych[facility]        Today's Diagnoses     Severe episode of recurrent major depressive disorder, without psychotic features (H)    -  1       Follow-ups after your visit        Your next 10 appointments already scheduled     Oct 05, 2018  1:45 PM CDT   TMS TREATMENT with ME [facility] PROCEDURE ROOM   [facility] ([facility])    [address]  [facility] [address]   [phone]            Oct 08, 2018  1:45 PM CDT   TMS TREATMENT with ME [facility] PROCEDURE ROOM   [facility] ([facility])    [address]  [facility] [address]   [phone]            Oct 09, 2018  1:45 PM CDT   TMS TREATMENT with ME [facility]MP PROCEDURE ROOM   [facility] ([facility])    [address]  [facility] [address]   [phone]            Oct 10, 2018  1:45 PM CDT   TMS TREATMENT with ME [facility]MP PROCEDURE ROOM   [facility] ([facility])    [address]  [facility] [address]   [phone]            Oct 11, 2018  1:45 PM CDT   TMS TREATMENT with ME [facility]MP PROCEDURE ROOM   [facility] ([facility])    [address]  [facility] [address]   [phone]            Oct 12, 2018  1:45 PM CDT   TMS TREATMENT with ME [facility] PROCEDURE ROOM   [facility] ([facility])    [address]  [facility] [address]   [phone]            Oct 15, 2018  1:45 PM CDT   TMS TREATMENT with ME [facility]MP PROCEDURE ROOM   [facility] ([facility])    [address]  [facility] [address]   [phone]            Oct 16, 2018  1:45 PM CDT   TMS TREATMENT with ME UMP PROCEDURE ROOM   [facility]  Psychiatry ([facility])    [address]  [facility] [address] [phone]            Oct 17, 2018  1:45 PM CDT   TMS TREATMENT with ME UMP PROCEDURE ROOM   [facility] Psychiatry ([facility])    [address]  [facility] [address] [phone]            Oct 18, 2018  1:45 PM CDT   TMS TREATMENT with ME UMP PROCEDURE ROOM   [facility] Psychiatry ([facility])    [address]  [facility] [address] [phone]              Who to contact     Please call your clinic at [phone] to:    Ask questions about your health    Make or cancel appointments    Discuss your medicines    Learn about your test results    Speak to your doctor            Additional Information About Your Visit        [employer] Information     [employer] gives you secure access to your electronic health record. If you see a primary care provider, you can also send messages to your care team and make appointments. If you have questions, please call your primary care clinic.  If you do not have a primary care provider, please call [phone] and they will assist you.      [employer] is an electronic gateway that provides easy, online access to your medical records. With [employer], you can request a clinic appointment, read your test results, renew a prescription or communicate with your care team.     To access your existing account, please contact your [facility] or call [phone] for assistance.        Care EveryWhere ID     This is your Care EveryWhere ID. This could be used by other organizations to access your [place] medical records  [id number]        Your Vitals Were     Pulse                   90            Blood Pressure from Last 3 Encounters:   10/03/18 132/82   10/02/18 131/82   10/01/18 126/76    Weight from Last 3 Encounters:   09/12/18 75.7 kg (166 lb 12.8 oz)   07/10/18 76.8 kg (169 lb 6.4 oz)              We  Performed the Following     C TRANSCRANIAL MAGNETIC STIMULATION TREATMENT,DELIVERY/MANAGEMENT        Primary Care Provider Office Phone # Fax #    [facility] [phone] [address]  [facility] [address]        Equal Access to Services     BIANCA KHAN : Hadii faye ku hadkendyo Soomaali, waaxda luqadaha, qaybta kaalmada adeegyada, waxay lloydin haybilln priscila ruiztikitrey robertson. So [facility] [phone].    ATENCIÓN: Si habla español, tiene a cole disposición servicios gratuitos de asistencia lingüística. Llame al [phone].    We comply with applicable federal civil rights laws and [place] laws. We do not discriminate on the basis of race, color, national origin, age, disability, sex, sexual orientation, or gender identity.            Thank you!     Thank you for choosing [facility]  for your care. Our goal is always to provide you with excellent care. Hearing back from our patients is one way we can continue to improve our services. Please take a few minutes to complete the written survey that you may receive in the mail after your visit with us. Thank you!             Your Updated Medication List - Protect others around you: Learn how to safely use, store and throw away your medicines at www.disposemymeds.org.          This list is accurate as of 9/28/18 11:59 PM.  Always use your most recent med list.                   Brand Name Dispense Instructions for use Diagnosis    ALPRAZolam 0.5 MG tablet    XANAX     CANCEL PRIOR RX -- UPDATED DOSE INSTRUCTIONS -- Take one-half to one tab by mouth at bedtime as needed for anxiety.        amphetamine-dextroamphetamine 30 MG per 24 hr capsule    ADDERALL XR     Take 30 mg by mouth        buPROPion 150 MG 24 hr tablet    WELLBUTRIN XL     TAKE 2 TABLETS EVERY       MORNING        cholecalciferol 5000 units Caps capsule    vitamin D3          Fish Oil 435 MG Caps      Take 1,200 mg by mouth        ketorolac 60 MG/2ML  Soln injection    TORADOL     Inject 30-60 mg into the muscle        MULTIPLE VITAMINS-MINERALS PO           Octacosanol 1000-5 MCG-UNIT Tabs           PARoxetine 30 MG tablet    PAXIL     Take 60 mg by mouth

## 2018-09-29 ASSESSMENT — PATIENT HEALTH QUESTIONNAIRE - PHQ9: SUM OF ALL RESPONSES TO PHQ QUESTIONS 1-9: 0

## 2018-10-01 ENCOUNTER — OFFICE VISIT (OUTPATIENT)
Dept: PSYCHIATRY | Facility: CLINIC | Age: 58
End: 2018-10-01
Payer: COMMERCIAL

## 2018-10-01 VITALS — SYSTOLIC BLOOD PRESSURE: 126 MMHG | DIASTOLIC BLOOD PRESSURE: 76 MMHG | HEART RATE: 93 BPM

## 2018-10-01 DIAGNOSIS — F33.2 SEVERE EPISODE OF RECURRENT MAJOR DEPRESSIVE DISORDER, WITHOUT PSYCHOTIC FEATURES (H): Primary | ICD-10-CM

## 2018-10-01 NOTE — MR AVS SNAPSHOT
After Visit Summary   10/1/2018    Samira Romero    MRN: [id number]           Patient Information     Date Of Birth          1960        Visit Information        Provider Department      10/1/2018 1:45 PM ME [facility] PROCEDURE ROOM [facility]        Today's Diagnoses     Severe episode of recurrent major depressive disorder, without psychotic features (H)    -  1       Follow-ups after your visit        Your next 10 appointments already scheduled     Oct 02, 2018  1:45 PM CDT   TMS TREATMENT with ME [facility] PROCEDURE ROOM   [facility] ([facility])    [address]  [facility] [address]   [phone]            Oct 03, 2018  1:45 PM CDT   TMS TREATMENT with ME [facility] PROCEDURE ROOM   [facility] ([facility])    [address]  [facility] [address]   [phone]            Oct 04, 2018  1:45 PM CDT   TMS TREATMENT with ME [facility] PROCEDURE ROOM   [facility] ([facility])    [address]  [facility] [address]   [phone]            Oct 05, 2018  1:45 PM CDT   TMS TREATMENT with ME [facility] PROCEDURE ROOM   [facility] ([facility])    [address]  [facility] [address]   [phone]            Oct 08, 2018  1:45 PM CDT   TMS TREATMENT with ME [facility] PROCEDURE ROOM   [facility] ([facility])    [address]  [facility] [address]   [phone]            Oct 09, 2018  1:45 PM CDT   TMS TREATMENT with ME [facility] PROCEDURE ROOM   [facility] ([facility])    [address]  [facility] [address]   [phone]            Oct 10, 2018  1:45 PM CDT   TMS TREATMENT with ME [facility] PROCEDURE ROOM   [facility] ([facility])    [address]  [facility] [address]   [phone]            Oct 11, 2018  1:45 PM CDT   TMS TREATMENT with ME UMP PROCEDURE ROOM   [facility]  Psychiatry ([facility])    [address]  [facility] [address]   [phone]            Oct 12, 2018  1:45 PM CDT   TMS TREATMENT with ME UMP PROCEDURE ROOM   [facility] Psychiat[facility] ([facility])    [address]  [facility] [address] [phone]            Oct 15, 2018  1:45 PM CDT   TMS TREATMENT with ME UMP PROCEDURE ROOM   [facility] Psychiatry ([facility])    [address]  [facility] [address] [phone]              Who to contact     Please call your clinic at [phone] to:    Ask questions about your health    Make or cancel appointments    Discuss your medicines    Learn about your test results    Speak to your doctor            Additional Information About Your Visit        [employer] Information     [employer] gives you secure access to your electronic health record. If you see a primary care provider, you can also send messages to your care team and make appointments. If you have questions, please call your primary care clinic.  If you do not have a primary care provider, please call [phone] and they will assist you.      [employer] is an electronic gateway that provides easy, online access to your medical records. With [employer], you can request a clinic appointment, read your test results, renew a prescription or communicate with your care team.     To access your existing account, please contact your [facility] or call [phone] for assistance.        Care EveryWhere ID     This is your Care EveryWhere ID. This could be used by other organizations to access your [facility] medical records  [id number]        Your Vitals Were     Pulse                   93            Blood Pressure from Last 3 Encounters:   10/01/18 126/76   09/28/18 139/81   09/27/18 113/88    Weight from Last 3 Encounters:   09/12/18 75.7 kg (166 lb 12.8 oz)   07/10/18 76.8 kg (169 lb 6.4 oz)              We  Performed the Following     C TRANSCRANIAL MAGNETIC STIMULATION TREATMENT,DELIVERY/MANAGEMENT        Primary Care Provider Office Phone # Fax #    [facility] [phone] [address]  [place] [address]        Equal Access to Services     BIANCA KHAN : Hadii aad ku hadkendyo Soomaali, waaxda luqadaha, qaybta kaalmada adeegyada, waxay lloydin haybilln priscila ruiztikitrey robertson. So [facility] [phone].    ATENCIÓN: Si habla español, tiene a cole disposición servicios gratuitos de asistencia lingüística. Llame al [phone].    We comply with applicable federal civil rights laws and [place] laws. We do not discriminate on the basis of race, color, national origin, age, disability, sex, sexual orientation, or gender identity.            Thank you!     Thank you for choosing [facility] PSYCHIATRY  for your care. Our goal is always to provide you with excellent care. Hearing back from our patients is one way we can continue to improve our services. Please take a few minutes to complete the written survey that you may receive in the mail after your visit with us. Thank you!             Your Updated Medication List - Protect others around you: Learn how to safely use, store and throw away your medicines at www.disposemymeds.org.          This list is accurate as of 10/1/18  5:06 PM.  Always use your most recent med list.                   Brand Name Dispense Instructions for use Diagnosis    ALPRAZolam 0.5 MG tablet    XANAX     CANCEL PRIOR RX -- UPDATED DOSE INSTRUCTIONS -- Take one-half to one tab by mouth at bedtime as needed for anxiety.        amphetamine-dextroamphetamine 30 MG per 24 hr capsule    ADDERALL XR     Take 30 mg by mouth        buPROPion 150 MG 24 hr tablet    WELLBUTRIN XL     TAKE 2 TABLETS EVERY       MORNING        cholecalciferol 5000 units Caps capsule    vitamin D3          Fish Oil 435 MG Caps      Take 1,200 mg by mouth        ketorolac 60 MG/2ML  Soln injection    TORADOL     Inject 30-60 mg into the muscle        MULTIPLE VITAMINS-MINERALS PO           Octacosanol 1000-5 MCG-UNIT Tabs           PARoxetine 30 MG tablet    PAXIL     Take 60 mg by mouth

## 2018-10-01 NOTE — PROGRESS NOTES
[facility] TMS Program  [address]  [place] [address]  TMS Procedure Note   Samira Romero MRN# [id number]  Age: 58 year old year old YOB: 1960    Pre-Procedure:  History and Physical: Reviewed in medical record  Consent Signed by: Samira Romero  On: 09/12/18    Clinical Narrative:  Pt tolerating treatment. We attempted to tilt the chair but patient preferred it straight up. IDSSR=10    Indications for TMS:  MDD, recurrent, severe; 4+ medication trials (from 2+ classes) ineffective; Psychotherapy ineffective.     Pre-Procedure Diagnosis:  MDD, recurrent, severe 296.33    Treatment Hx:  Treatment number this series: 12  Total lifetime treatment number: 12    Allergies   Allergen Reactions     Codeine Nausea and Vomiting     Penicillins Rash      /76 (BP Location: Left arm, Patient Position: Sitting, Cuff Size: Adult Regular)  Pulse 93    Pause for the Cause  Right patient:  Yes  Right procedure/correct coil:  Yes; rTMS; cpt 83432; H1 coil.   Earplugs in place:  Yes    Procedure  Patient was seated in procedure chair. Identity and procedure was verified. Ear plugs were placed in ears and patient-specific cap was placed on head and tightened appropriately. Ruler locations were verified. Coil was placed at treatment location and stimulator was set to parameters described below. A test train was delivered and pt tolerated train. Given pt tolerance, 55 treatment trains were delivered. Pt tolerated procedure  some minimal right eyebrow movement and facial activation.      Motor Threshold Determination  Distance from nasion to inion: 36 cm  MT 1: 0 - 7- 14.5 @ 49% on 9/12/2018  MT 2: 0 - 7- 14.5 @ 49% on 9/27/2018      Stimulation Parameters  Frequency: 18 Hz     Train duration: 2 sec  Total pulses delivered: 1980  Inter-train interval: 20 sec  Tx Loc: 0 - eyebrows  - 14.5  Energy: 58% (120% MT)  Trains: 55 trains    Psychiatric Examination  Appearance:  awake, alert and  adequately groomed  Attitude:  cooperative  Eye Contact:  fair  Mood:  anxious and depressed  Affect:  mood congruent, intensity is blunted, restricted range and reactive  Speech:  clear, coherent and normal prosody  Psychomotor Behavior:  no evidence of tardive dyskinesia, dystonia, or tics and intact station, gait and muscle tone  Thought Process:  logical, linear and goal oriented  Associations:  no loose associations  Thought Content:  no evidence of suicidal ideation or homicidal ideation and no evidence of psychotic thought  Insight:  good  Judgment:  intact  Oriented to:  time, person, and place  Attention Span and Concentration:  intact  Recent and Remote Memory:  intact  Language: Able to name objects, Able to repeat phrases and Able to read and write  Fund of Knowledge: appropriate  Muscle Strength and Tone: normal  Gait and Station: Normal    Post-Procedure Diagnosis:  MDD, recurrent, severe 296.33      Yanci Goss RN   [facility]      Plan   - Cont TMS       I didn t see the patient during/after treatment but remained available in the clinic during  treatment.    Jesus Carlson MD  [facility]

## 2018-10-02 ENCOUNTER — OFFICE VISIT (OUTPATIENT)
Dept: PSYCHIATRY | Facility: CLINIC | Age: 58
End: 2018-10-02
Payer: COMMERCIAL

## 2018-10-02 VITALS — HEART RATE: 88 BPM | SYSTOLIC BLOOD PRESSURE: 131 MMHG | DIASTOLIC BLOOD PRESSURE: 82 MMHG

## 2018-10-02 DIAGNOSIS — F33.2 SEVERE EPISODE OF RECURRENT MAJOR DEPRESSIVE DISORDER, WITHOUT PSYCHOTIC FEATURES (H): Primary | ICD-10-CM

## 2018-10-02 ASSESSMENT — PATIENT HEALTH QUESTIONNAIRE - PHQ9: SUM OF ALL RESPONSES TO PHQ QUESTIONS 1-9: 2

## 2018-10-02 NOTE — PROGRESS NOTES
[facility] TMS Program  [address]  [place] [address]  TMS Procedure Note   Samira Romero MRN# [id number]  Age: 58 year old year old YOB: 1960    Pre-Procedure:  History and Physical: Reviewed in medical record  Consent Signed by: Samira Romero  On: 09/12/18    Clinical Narrative:  Pt tolerating treatment. IDSSR=10    Indications for TMS:  MDD, recurrent, severe; 4+ medication trials (from 2+ classes) ineffective; Psychotherapy ineffective.     Pre-Procedure Diagnosis:  MDD, recurrent, severe 296.33    Treatment Hx:  Treatment number this series: 13  Total lifetime treatment number: 13    Allergies   Allergen Reactions     Codeine Nausea and Vomiting     Penicillins Rash      /82 (BP Location: Left arm, Patient Position: Sitting, Cuff Size: Adult Regular)  Pulse 88    Pause for the Cause  Right patient:  Yes  Right procedure/correct coil:  Yes; rTMS; cpt 31762; H1 coil.   Earplugs in place:  Yes    Procedure  Patient was seated in procedure chair. Identity and procedure was verified. Ear plugs were placed in ears and patient-specific cap was placed on head and tightened appropriately. Ruler locations were verified. Coil was placed at treatment location and stimulator was set to parameters described below. A test train was delivered and pt tolerated train. Given pt tolerance, 55 treatment trains were delivered. Pt tolerated procedure  some minimal right eyebrow movement and facial activation.      Motor Threshold Determination  Distance from nasion to inion: 36 cm  MT 1: 0 - 7- 14.5 @ 49% on 9/12/2018  MT 2: 0 - 7- 14.5 @ 49% on 9/27/2018      Stimulation Parameters  Frequency: 18 Hz     Train duration: 2 sec  Total pulses delivered: 1980  Inter-train interval: 20 sec  Tx Loc: 0 - eyebrows  - 14.5  Energy: 58% (120% MT)  Trains: 55 trains    Psychiatric Examination  Appearance:  awake, alert and adequately groomed  Attitude:  cooperative  Eye Contact:  fair  Mood:   anxious and depressed  Affect:  mood congruent, intensity is blunted, restricted range and reactive  Speech:  clear, coherent and normal prosody  Psychomotor Behavior:  no evidence of tardive dyskinesia, dystonia, or tics and intact station, gait and muscle tone  Thought Process:  logical, linear and goal oriented  Associations:  no loose associations  Thought Content:  no evidence of suicidal ideation or homicidal ideation and no evidence of psychotic thought  Insight:  good  Judgment:  intact  Oriented to:  time, person, and place  Attention Span and Concentration:  intact  Recent and Remote Memory:  intact  Language: Able to name objects, Able to repeat phrases and Able to read and write  Fund of Knowledge: appropriate  Muscle Strength and Tone: normal  Gait and Station: Normal    Post-Procedure Diagnosis:  MDD, recurrent, severe 296.33      Yanci Goss RN   [facility]      Plan   - Cont TMS       I didn t see the patient during/after treatment but remained available in the clinic during  treatment.    JONATHAN Snow MD  [facility]

## 2018-10-02 NOTE — MR AVS SNAPSHOT
After Visit Summary   10/2/2018    Samira Romero    MRN: [id number]           Patient Information     Date Of Birth          1960        Visit Information        Provider Department      10/2/2018 1:45 PM ME [facility] PROCEDURE ROOM [facility] Psychiat[facility]        Today's Diagnoses     Severe episode of recurrent major depressive disorder, without psychotic features (H)    -  1       Follow-ups after your visit        Your next 10 appointments already scheduled     Oct 05, 2018  1:45 PM CDT   TMS TREATMENT with ME [facility] PROCEDURE ROOM   [facility] ([facility])    [address]  [facility] [address]   [phone]            Oct 08, 2018  1:45 PM CDT   TMS TREATMENT with ME UMP PROCEDURE ROOM   [facility] ([facility])    [address]  [facility] [address]   [phone]            Oct 09, 2018  1:45 PM CDT   TMS TREATMENT with ME [facility]MP PROCEDURE ROOM   [facility] ([facility])    [address]  [facility] [address]   [phone]            Oct 10, 2018  1:45 PM CDT   TMS TREATMENT with ME [facility] PROCEDURE ROOM   [facility] ([facility])    [address]  [facility] [address]   [phone]            Oct 11, 2018  1:45 PM CDT   TMS TREATMENT with ME UMP PROCEDURE ROOM   [facility] ([facility])    [address]  [facility] [address]   [phone]            Oct 12, 2018  1:45 PM CDT   TMS TREATMENT with ME UMP PROCEDURE ROOM   [facility] ([facility])    [address]  [facility] [address]   [phone]            Oct 15, 2018  1:45 PM CDT   TMS TREATMENT with ME UMP PROCEDURE ROOM   [facility] ([facility])    [address]  [facility] [address]   [phone]            Oct 16, 2018  1:45 PM CDT   TMS TREATMENT with ME UMP PROCEDURE ROOM   [facility]  Psychiatry ([facility])    [address]  [facility] [address] [phone]            Oct 17, 2018  1:45 PM CDT   TMS TREATMENT with ME UMP PROCEDURE ROOM   [facility] Psychiatry ([facility])    [address]  [facility] [address] [phone]            Oct 18, 2018  1:45 PM CDT   TMS TREATMENT with ME UMP PROCEDURE ROOM   [facility] Psych[facility] ([facility])    [address]  [facility] [address] [phone]              Who to contact     Please call your clinic at [phone] to:    Ask questions about your health    Make or cancel appointments    Discuss your medicines    Learn about your test results    Speak to your doctor            Additional Information About Your Visit        [employer] Information     [employer] gives you secure access to your electronic health record. If you see a primary care provider, you can also send messages to your care team and make appointments. If you have questions, please call your primary care clinic.  If you do not have a primary care provider, please call [phone] and they will assist you.      [employer] is an electronic gateway that provides easy, online access to your medical records. With [employer], you can request a clinic appointment, read your test results, renew a prescription or communicate with your care team.     To access your existing account, please contact your [facility] or call [phone] for assistance.        Care EveryWhere ID     This is your Care EveryWhere ID. This could be used by other organizations to access your [facility] medical records  [id number]        Your Vitals Were     Pulse                   88            Blood Pressure from Last 3 Encounters:   10/03/18 132/82   10/02/18 131/82   10/01/18 126/76    Weight from Last 3 Encounters:   09/12/18 75.7 kg (166 lb 12.8 oz)   07/10/18 76.8 kg (169 lb 6.4 oz)              We  Performed the Following     C TRANSCRANIAL MAGNETIC STIMULATION TREATMENT,DELIVERY/MANAGEMENT        Primary Care Provider Office Phone # Fax #    [facility] [phone] [address]  [facility] [address]        Equal Access to Services     BIANCA KHAN : Hadii aad ku hadkendyo Soomaali, waaxda luqadaha, qaybta kaalmada adeegyada, waxay lloydin haybilln priscila ruiztikitrey robertson. So [facility] [phone].    ATENCIÓN: Si habla español, tiene a cole disposición servicios gratuitos de asistencia lingüística. Llame al [phone].    We comply with applicable federal civil rights laws and [place] laws. We do not discriminate on the basis of race, color, national origin, age, disability, sex, sexual orientation, or gender identity.            Thank you!     Thank you for choosing [facility] PSYCHIATRY  for your care. Our goal is always to provide you with excellent care. Hearing back from our patients is one way we can continue to improve our services. Please take a few minutes to complete the written survey that you may receive in the mail after your visit with us. Thank you!             Your Updated Medication List - Protect others around you: Learn how to safely use, store and throw away your medicines at www.disposemymeds.org.          This list is accurate as of 10/2/18 11:59 PM.  Always use your most recent med list.                   Brand Name Dispense Instructions for use Diagnosis    ALPRAZolam 0.5 MG tablet    XANAX     CANCEL PRIOR RX -- UPDATED DOSE INSTRUCTIONS -- Take one-half to one tab by mouth at bedtime as needed for anxiety.        amphetamine-dextroamphetamine 30 MG per 24 hr capsule    ADDERALL XR     Take 30 mg by mouth        buPROPion 150 MG 24 hr tablet    WELLBUTRIN XL     TAKE 2 TABLETS EVERY       MORNING        cholecalciferol 5000 units Caps capsule    vitamin D3          Fish Oil 435 MG Caps      Take 1,200 mg by mouth        ketorolac 60 MG/2ML  Soln injection    TORADOL     Inject 30-60 mg into the muscle        MULTIPLE VITAMINS-MINERALS PO           Octacosanol 1000-5 MCG-UNIT Tabs           PARoxetine 30 MG tablet    PAXIL     Take 60 mg by mouth

## 2018-10-03 ENCOUNTER — OFFICE VISIT (OUTPATIENT)
Dept: PSYCHIATRY | Facility: CLINIC | Age: 58
End: 2018-10-03
Payer: COMMERCIAL

## 2018-10-03 VITALS — HEART RATE: 98 BPM | SYSTOLIC BLOOD PRESSURE: 132 MMHG | DIASTOLIC BLOOD PRESSURE: 82 MMHG

## 2018-10-03 DIAGNOSIS — F33.2 SEVERE EPISODE OF RECURRENT MAJOR DEPRESSIVE DISORDER, WITHOUT PSYCHOTIC FEATURES (H): Primary | ICD-10-CM

## 2018-10-03 ASSESSMENT — PATIENT HEALTH QUESTIONNAIRE - PHQ9: SUM OF ALL RESPONSES TO PHQ QUESTIONS 1-9: 2

## 2018-10-03 NOTE — MR AVS SNAPSHOT
After Visit Summary   10/3/2018    Samira Romero    MRN: [id number]           Patient Information     Date Of Birth          1960        Visit Information        Provider Department      10/3/2018 1:45 PM ME [facility] PROCEDURE ROOM [facility]        Today's Diagnoses     Severe episode of recurrent major depressive disorder, without psychotic features (H)    -  1       Follow-ups after your visit        Your next 10 appointments already scheduled     Oct 05, 2018  1:45 PM CDT   TMS TREATMENT with ME [facility] PROCEDURE ROOM   [facility] ([facility])    [address]  [facility] [address]   [phone]            Oct 08, 2018  1:45 PM CDT   TMS TREATMENT with ME [facility] PROCEDURE ROOM   [facility] ([facility])    [address]  [facility] [address]   [phone]            Oct 09, 2018  1:45 PM CDT   TMS TREATMENT with ME [facility] PROCEDURE ROOM   [facility] ([facility])    [address]  [facility] [address]   [phone]            Oct 10, 2018  1:45 PM CDT   TMS TREATMENT with ME [facility] PROCEDURE ROOM   [facility] ([facility])    [address]  [facility] [address]   [phone]            Oct 11, 2018  1:45 PM CDT   TMS TREATMENT with ME [facility] PROCEDURE ROOM   [facility] ([facility])    [address]  [facility] [address]   [phone]            Oct 12, 2018  1:45 PM CDT   TMS TREATMENT with ME [facility] PROCEDURE ROOM   [facility] ([facility])    [address]  [facility] [address]   [phone]            Oct 15, 2018  1:45 PM CDT   TMS TREATMENT with ME [facility] PROCEDURE ROOM   [facility] ([facility])    [address]  [facility] [address]   [phone]            Oct 16, 2018  1:45 PM CDT   TMS TREATMENT with ME [facility]MP PROCEDURE ROOM   [facility]  Psychiatry ([facility])    [address]  [facility] [address] [phone]            Oct 17, 2018  1:45 PM CDT   TMS TREATMENT with ME UMP PROCEDURE ROOM   [facility] ([facility])    [address]  [facility] [address] [phone]            Oct 18, 2018  1:45 PM CDT   TMS TREATMENT with ME [facility]MP PROCEDURE ROOM   [facility] ([facility])    [address]  [facility] [address] [phone]              Who to contact     Please call your clinic at [phone] to:    Ask questions about your health    Make or cancel appointments    Discuss your medicines    Learn about your test results    Speak to your doctor            Additional Information About Your Visit        [employer] Information     [employer]ze gives you secure access to your electronic health record. If you see a primary care provider, you can also send messages to your care team and make appointments. If you have questions, please call your primary care clinic.  If you do not have a primary care provider, please call [phone] and they will assist you.      Sazze is an electronic gateway that provides easy, online access to your medical records. With [employer]ze, you can request a clinic appointment, read your test results, renew a prescription or communicate with your care team.     To access your existing account, please contact your [facility] or call [phone] for assistance.        Care EveryWhere ID     This is your Care EveryWhere ID. This could be used by other organizations to access your [facility] medical records  [id number]        Your Vitals Were     Pulse                   98            Blood Pressure from Last 3 Encounters:   10/03/18 132/82   10/02/18 131/82   10/01/18 126/76    Weight from Last 3 Encounters:   09/12/18 75.7 kg (166 lb 12.8 oz)   07/10/18 76.8 kg (169 lb 6.4 oz)              We  Performed the Following     C TRANSCRANIAL MAGNETIC STIMULATION TREATMENT,DELIVERY/MANAGEMENT        Primary Care Provider Office Phone # Fax #    [facility] [phone] [address]  [place] [address]        Equal Access to Services     BIANCA KHAN : Hadii aad ku hadkendyo Soomaali, waaxda luqadaha, qaybta kaalmada adeegyada, waxay lloydin haybilln priscila ruiztikitrey robertson. So [facility] [phone].    ATENCIÓN: Si habla español, tiene a cole disposición servicios gratuitos de asistencia lingüística. Llame al [phone].    We comply with applicable federal civil rights laws and [place] laws. We do not discriminate on the basis of race, color, national origin, age, disability, sex, sexual orientation, or gender identity.            Thank you!     Thank you for choosing [facility]  for your care. Our goal is always to provide you with excellent care. Hearing back from our patients is one way we can continue to improve our services. Please take a few minutes to complete the written survey that you may receive in the mail after your visit with us. Thank you!             Your Updated Medication List - Protect others around you: Learn how to safely use, store and throw away your medicines at www.disposemymeds.org.          This list is accurate as of 10/3/18 11:59 PM.  Always use your most recent med list.                   Brand Name Dispense Instructions for use Diagnosis    ALPRAZolam 0.5 MG tablet    XANAX     CANCEL PRIOR RX -- UPDATED DOSE INSTRUCTIONS -- Take one-half to one tab by mouth at bedtime as needed for anxiety.        amphetamine-dextroamphetamine 30 MG per 24 hr capsule    ADDERALL XR     Take 30 mg by mouth        buPROPion 150 MG 24 hr tablet    WELLBUTRIN XL     TAKE 2 TABLETS EVERY       MORNING        cholecalciferol 5000 units Caps capsule    vitamin D3          Fish Oil 435 MG Caps      Take 1,200 mg by mouth        ketorolac 60 MG/2ML  Soln injection    TORADOL     Inject 30-60 mg into the muscle        MULTIPLE VITAMINS-MINERALS PO           Octacosanol 1000-5 MCG-UNIT Tabs           PARoxetine 30 MG tablet    PAXIL     Take 60 mg by mouth

## 2018-10-03 NOTE — PROGRESS NOTES
[facility] TMS Program  [address]  [place] [address]  TMS Procedure Note   Samira Romero MRN# [id number]  Age: 58 year old year old YOB: 1960    Pre-Procedure:  History and Physical: Reviewed in medical record  Consent Signed by: Samira Romero  On: 09/12/18    Clinical Narrative:  Pt tolerating treatment. IDSSR=10    Indications for TMS:  MDD, recurrent, severe; 4+ medication trials (from 2+ classes) ineffective; Psychotherapy ineffective.     Pre-Procedure Diagnosis:  MDD, recurrent, severe 296.33    Treatment Hx:  Treatment number this series: 14  Total lifetime treatment number: 14    Allergies   Allergen Reactions     Codeine Nausea and Vomiting     Penicillins Rash      /82 (BP Location: Left arm, Patient Position: Sitting, Cuff Size: Adult Regular)  Pulse 98    Pause for the Cause  Right patient:  Yes  Right procedure/correct coil:  Yes; rTMS; cpt 92580; H1 coil.   Earplugs in place:  Yes    Procedure  Patient was seated in procedure chair. Identity and procedure was verified. Ear plugs were placed in ears and patient-specific cap was placed on head and tightened appropriately. Ruler locations were verified. Coil was placed at treatment location and stimulator was set to parameters described below. A test train was delivered and pt tolerated train. Given pt tolerance, 55 treatment trains were delivered. Pt tolerated procedure  some minimal right eyebrow movement and facial activation.      Motor Threshold Determination  Distance from nasion to inion: 36 cm  MT 1: 0 - 7- 14.5 @ 49% on 9/12/2018  MT 2: 0 - 7- 14.5 @ 49% on 9/27/2018      Stimulation Parameters  Frequency: 18 Hz     Train duration: 2 sec  Total pulses delivered: 1980  Inter-train interval: 20 sec  Tx Loc: 0 - eyebrows  - 14.5  Energy: 58% (120% MT)  Trains: 55 trains    Psychiatric Examination  Appearance:  awake, alert and adequately groomed  Attitude:  cooperative  Eye Contact:  fair  Mood:   anxious and depressed  Affect:  mood congruent, intensity is blunted, restricted range and reactive  Speech:  clear, coherent and normal prosody  Psychomotor Behavior:  no evidence of tardive dyskinesia, dystonia, or tics and intact station, gait and muscle tone  Thought Process:  logical, linear and goal oriented  Associations:  no loose associations  Thought Content:  no evidence of suicidal ideation or homicidal ideation and no evidence of psychotic thought  Insight:  good  Judgment:  intact  Oriented to:  time, person, and place  Attention Span and Concentration:  intact  Recent and Remote Memory:  intact  Language: Able to name objects, Able to repeat phrases and Able to read and write  Fund of Knowledge: appropriate  Muscle Strength and Tone: normal  Gait and Station: Normal    Post-Procedure Diagnosis:  MDD, recurrent, severe 296.33      Yanci Goss RN   [facility]      Plan   - Cont TMS       I didn t see the patient during/after treatment but remained available in the clinic during  treatment.    Alina Mcintyre MD  [facility]

## 2018-10-04 ASSESSMENT — PATIENT HEALTH QUESTIONNAIRE - PHQ9: SUM OF ALL RESPONSES TO PHQ QUESTIONS 1-9: 2

## 2018-10-05 ENCOUNTER — OFFICE VISIT (OUTPATIENT)
Dept: PSYCHIATRY | Facility: CLINIC | Age: 58
End: 2018-10-05
Payer: COMMERCIAL

## 2018-10-05 VITALS — HEART RATE: 88 BPM | DIASTOLIC BLOOD PRESSURE: 80 MMHG | SYSTOLIC BLOOD PRESSURE: 117 MMHG

## 2018-10-05 DIAGNOSIS — F33.2 SEVERE EPISODE OF RECURRENT MAJOR DEPRESSIVE DISORDER, WITHOUT PSYCHOTIC FEATURES (H): Primary | ICD-10-CM

## 2018-10-05 NOTE — MR AVS SNAPSHOT
After Visit Summary   10/5/2018    Samria Romero    MRN: [id number]           Patient Information     Date Of Birth          1960        Visit Information        Provider Department      10/5/2018 1:45 PM ME [facility] PROCEDURE ROOM [facility] Psychiat[facility]        Today's Diagnoses     Severe episode of recurrent major depressive disorder, without psychotic features (H)    -  1       Follow-ups after your visit        Your next 10 appointments already scheduled     Oct 22, 2018  1:45 PM CDT   TMS TREATMENT with ME [facility] PROCEDURE ROOM   [facility] ([facility])    [address]  [facility] [address]   [phone]            Oct 23, 2018  1:45 PM CDT   TMS TREATMENT with ME [facility] PROCEDURE ROOM   [facility] ([facility])    [address]  [facility] [address]   [phone]            Oct 24, 2018  1:45 PM CDT   TMS TREATMENT with ME [facility] PROCEDURE ROOM   [facility] ([facility])    [address]  [facility] [address]   [phone]            Oct 25, 2018  1:45 PM CDT   TMS TREATMENT with ME [facility] PROCEDURE ROOM   [facility] ([facility])    [address]  [facility] [address]   [phone]            Oct 26, 2018  1:45 PM CDT   TMS TREATMENT with ME [facility] PROCEDURE ROOM   [facility] ([facility])    [address]  [facility] [address]   [phone]            Oct 29, 2018  1:45 PM CDT   TMS TREATMENT with ME [facility] PROCEDURE ROOM   [facility] ([facility])    [address]  [facility] [address]   [phone]            Oct 30, 2018  1:45 PM CDT   TMS TREATMENT with ME [facility] PROCEDURE ROOM   [facility] ([facility])    [address]  [facility] [address]   [phone]            Oct 31, 2018  1:45 PM CDT   TMS TREATMENT with ME [facility] PROCEDURE ROOM   [facility] ([facility])    [address]  [facility] [address] [phone]            Nov 01, 2018  1:45 PM CDT   TMS TREATMENT with ME [facility] PROCEDURE ROOM   [facility] ([facility])    [address]  [facility] [address] [phone]              Who to contact     Please call your clinic at [phone] to:    Ask questions about your health    Make or cancel appointments    Discuss your medicines    Learn about your test results    Speak to your doctor            Additional Information About Your Visit        [employer] Information     [employer] gives you secure access to your electronic health record. If you see a primary care provider, you can also send messages to your care team and make appointments. If you have questions, please call your primary care clinic.  If you do not have a primary care provider, please call [phone] and they will assist you.      [employer] is an electronic gateway that provides easy, online access to your medical records. With [employer], you can request a clinic appointment, read your test results, renew a prescription or communicate with your care team.     To access your existing account, please contact your [facility] or call [phone] for assistance.        Care EveryWhere ID     This is your Care EveryWhere ID. This could be used by other organizations to access your [place] medical records  [id number]        Your Vitals Were     Pulse                   88            Blood Pressure from Last 3 Encounters:   10/19/18 138/78   10/18/18 129/76   10/17/18 120/72    Weight from Last 3 Encounters:   09/12/18 75.7 kg (166 lb 12.8 oz)   07/10/18 76.8 kg (169 lb 6.4 oz)              We Performed the Following     C TRANSCRANIAL MAGNETIC STIMULATION TREATMENT,DELIVERY/MANAGEMENT        Primary Care Provider Office Phone # Fax #    [facility] [phone]  [phone]       [address]  [facility] [address]        Equal Access to Services     BIANCA KHAN : Hadii aad ku hadkendyo Socassandra, waaxda luqadaha, qaybta kaalmada adevenkatada, jaimee lloydin hayaabeth lamavalentino romero jade robertson. So [facility] [phone].    ATENCIÓN: Si habla español, tiene a cole disposición servicios gratuitos de asistencia lingüística. Rl al [phone].    We comply with applicable federal civil rights laws and [place] laws. We do not discriminate on the basis of race, color, national origin, age, disability, sex, sexual orientation, or gender identity.            Thank you!     Thank you for choosing [facility]  for your care. Our goal is always to provide you with excellent care. Hearing back from our patients is one way we can continue to improve our services. Please take a few minutes to complete the written survey that you may receive in the mail after your visit with us. Thank you!             Your Updated Medication List - Protect others around you: Learn how to safely use, store and throw away your medicines at www.disposemymeds.org.          This list is accurate as of 10/5/18 11:59 PM.  Always use your most recent med list.                   Brand Name Dispense Instructions for use Diagnosis    ALPRAZolam 0.5 MG tablet    XANAX     CANCEL PRIOR RX -- UPDATED DOSE INSTRUCTIONS -- Take one-half to one tab by mouth at bedtime as needed for anxiety.        amphetamine-dextroamphetamine 30 MG per 24 hr capsule    ADDERALL XR     Take 30 mg by mouth        buPROPion 150 MG 24 hr tablet    WELLBUTRIN XL     TAKE 2 TABLETS EVERY       MORNING        cholecalciferol 5000 units Caps capsule    vitamin D3          Fish Oil 435 MG Caps      Take 1,200 mg by mouth        ketorolac 60 MG/2ML Soln injection    TORADOL     Inject 30-60 mg into the muscle        MULTIPLE VITAMINS-MINERALS PO           Octacosanol 1000-5 MCG-UNIT Tabs           PARoxetine 30 MG tablet    PAXIL     Take  60 mg by mouth

## 2018-10-05 NOTE — PROGRESS NOTES
[facility] TMS Program  [address]  [place] [address]  TMS Procedure Note   Samira Romero MRN# [id number]  Age: 58 year old year old YOB: 1960    Pre-Procedure:  History and Physical: Reviewed in medical record  Consent Signed by: Samira Romero  On: 09/12/18    Clinical Narrative:  Pt tolerating treatment. IDSSR=24    Indications for TMS:  MDD, recurrent, severe; 4+ medication trials (from 2+ classes) ineffective; Psychotherapy ineffective.     Pre-Procedure Diagnosis:  MDD, recurrent, severe 296.33    Treatment Hx:  Treatment number this series: 15  Total lifetime treatment number: 15    Allergies   Allergen Reactions     Codeine Nausea and Vomiting     Penicillins Rash      /80 (BP Location: Right arm, Patient Position: Chair, Cuff Size: Adult Regular)  Pulse 88    Pause for the Cause  Right patient:  Yes  Right procedure/correct coil:  Yes; rTMS; cpt 41704; H1 coil.   Earplugs in place:  Yes    Procedure  Patient was seated in procedure chair. Identity and procedure was verified. Ear plugs were placed in ears and patient-specific cap was placed on head and tightened appropriately. Ruler locations were verified. Coil was placed at treatment location and stimulator was set to parameters described below. A test train was delivered and pt tolerated train. Given pt tolerance, 55 treatment trains were delivered. Pt tolerated procedure  some minimal right eyebrow movement and facial activation.      Motor Threshold Determination  Distance from nasion to inion: 36 cm  MT 1: 0 - 7- 14.5 @ 49% on 9/12/2018  MT 2: 0 - 7- 14.5 @ 49% on 9/27/2018      Stimulation Parameters  Frequency: 18 Hz     Train duration: 2 sec  Total pulses delivered: 1980  Inter-train interval: 20 sec  Tx Loc: 0 - eyebrows  - 14.5  Energy: 58% (120% MT)  Trains: 55 trains    Psychiatric Examination  Appearance:  awake, alert and adequately groomed  Attitude:  cooperative  Eye Contact:  fair  Mood:   anxious and depressed  Affect:  mood congruent, intensity is blunted, restricted range and reactive  Speech:  clear, coherent and normal prosody  Psychomotor Behavior:  no evidence of tardive dyskinesia, dystonia, or tics and intact station, gait and muscle tone  Thought Process:  logical, linear and goal oriented  Associations:  no loose associations  Thought Content:  no evidence of suicidal ideation or homicidal ideation and no evidence of psychotic thought  Insight:  good  Judgment:  intact  Oriented to:  time, person, and place  Attention Span and Concentration:  intact  Recent and Remote Memory:  intact  Language: Able to name objects, Able to repeat phrases and Able to read and write  Fund of Knowledge: appropriate  Muscle Strength and Tone: normal  Gait and Station: Normal    Post-Procedure Diagnosis:  MDD, recurrent, severe 296.33      Quinton Hendricks LPN  [facility]      Plan   - Cont TMS       I didn t see the patient during/after treatment but remained available in the clinic during  treatment.    JONATHAN Snow MD  [facility]

## 2018-10-06 ASSESSMENT — PATIENT HEALTH QUESTIONNAIRE - PHQ9: SUM OF ALL RESPONSES TO PHQ QUESTIONS 1-9: 5

## 2018-10-08 ENCOUNTER — OFFICE VISIT (OUTPATIENT)
Dept: PSYCHIATRY | Facility: CLINIC | Age: 58
End: 2018-10-08
Payer: COMMERCIAL

## 2018-10-08 VITALS — HEART RATE: 71 BPM | DIASTOLIC BLOOD PRESSURE: 80 MMHG | SYSTOLIC BLOOD PRESSURE: 118 MMHG

## 2018-10-08 DIAGNOSIS — F33.2 SEVERE EPISODE OF RECURRENT MAJOR DEPRESSIVE DISORDER, WITHOUT PSYCHOTIC FEATURES (H): Primary | ICD-10-CM

## 2018-10-08 NOTE — PROGRESS NOTES
[facility] TMS Program  [address]  [place] [address]  TMS Procedure Note   Samira Romero MRN# [id number]  Age: 58 year old year old YOB: 1960    Pre-Procedure:  History and Physical: Reviewed in medical record  Consent Signed by: Samira Romero  On: 09/12/18    Clinical Narrative:  Pt tolerating treatment.     Indications for TMS:  MDD, recurrent, severe; 4+ medication trials (from 2+ classes) ineffective; Psychotherapy ineffective.     Pre-Procedure Diagnosis:  MDD, recurrent, severe 296.33    Treatment Hx:  Treatment number this series: 16  Total lifetime treatment number: 16    Allergies   Allergen Reactions     Codeine Nausea and Vomiting     Penicillins Rash      /80 (BP Location: Right arm, Patient Position: Sitting, Cuff Size: Adult Regular)  Pulse 71    Pause for the Cause  Right patient:  Yes  Right procedure/correct coil:  Yes; rTMS; cpt 16968; H1 coil.   Earplugs in place:  Yes    Procedure  Patient was seated in procedure chair. Identity and procedure was verified. Ear plugs were placed in ears and patient-specific cap was placed on head and tightened appropriately. Ruler locations were verified. Coil was placed at treatment location and stimulator was set to parameters described below. A test train was delivered and pt tolerated train. Given pt tolerance, 55 treatment trains were delivered. Pt tolerated procedure with right facial and right hand movement.      Motor Threshold Determination  Distance from nasion to inion: 36 cm  MT 1: 0 - 7- 14.5 @ 49% on 9/12/2018  MT 2: 0 - 7- 14.5 @ 49% on 9/27/2018      Stimulation Parameters  Frequency: 18 Hz     Train duration: 2 sec  Total pulses delivered: 1980  Inter-train interval: 20 sec  Tx Loc: 0 - eyebrows  - 14.5  Energy: 58% (120% MT)  Trains: 55 trains      Post-Procedure Diagnosis:  MDD, recurrent, severe F33.2      Meghan Connell  [facility]      Plan   -  Cont TMS       I didn t see the patient during/after treatment but remained available in the clinic during  treatment.    Jesus Carlson MD  [facility]

## 2018-10-08 NOTE — MR AVS SNAPSHOT
After Visit Summary   10/8/2018    Samira Romero    MRN: [id number]           Patient Information     Date Of Birth          1960        Visit Information        Provider Department      10/8/2018 1:45 PM ME [facility] PROCEDURE ROOM [facility] Psych[facility]        Today's Diagnoses     Severe episode of recurrent major depressive disorder, without psychotic features (H)    -  1       Follow-ups after your visit        Your next 10 appointments already scheduled     Oct 09, 2018  1:45 PM CDT   TMS TREATMENT with ME [facility] PROCEDURE ROOM   [facility] ([facility])    [address]  [facility] [address]   [phone]            Oct 10, 2018  1:45 PM CDT   TMS TREATMENT with ME [facility] PROCEDURE ROOM   [facility] ([facility])    [address]  [facility] [address]   [phone]            Oct 11, 2018  1:45 PM CDT   TMS TREATMENT with ME [facility] PROCEDURE ROOM   [facility] ([facility])    [address]  [facility] [address]   [phone]            Oct 12, 2018  1:45 PM CDT   TMS TREATMENT with ME [facility] PROCEDURE ROOM   [facility] ([facility])    [address]  [facility] [address]   [phone]            Oct 15, 2018  1:45 PM CDT   TMS TREATMENT with ME [facility] PROCEDURE ROOM   [facility] ([facility])    [address]  [facility] [address]   [phone]            Oct 16, 2018  1:45 PM CDT   TMS TREATMENT with ME [facility] PROCEDURE ROOM   [facility] ([facility])    [address]  [facility] [address]   [phone]            Oct 17, 2018  1:45 PM CDT   TMS TREATMENT with ME [facility] PROCEDURE ROOM   [facility] ([facility])    [address]  [facility] [address]   [phone]            Oct 18, 2018  1:45 PM CDT   TMS TREATMENT with ME UMP PROCEDURE ROOM   [facility]  Psychiatry ([facility])    [address]  [facility] [address] [phone]            Oct 19, 2018  1:45 PM CDT   TMS TREATMENT with ME UMP PROCEDURE ROOM   [facility] Psychiatry ([facility])    [address]  [facility] [address] [phone]            Oct 22, 2018  1:45 PM CDT   TMS TREATMENT with ME UMP PROCEDURE ROOM   [facility] ([facility])    [address]  [facility] [address] [phone]              Who to contact     Please call your clinic at [phone] to:    Ask questions about your health    Make or cancel appointments    Discuss your medicines    Learn about your test results    Speak to your doctor            Additional Information About Your Visit        [employer] Information     [employer] gives you secure access to your electronic health record. If you see a primary care provider, you can also send messages to your care team and make appointments. If you have questions, please call your primary care clinic.  If you do not have a primary care provider, please call [phone] and they will assist you.      [employer] is an electronic gateway that provides easy, online access to your medical records. With [employer], you can request a clinic appointment, read your test results, renew a prescription or communicate with your care team.     To access your existing account, please contact your [facility] or call [phone] for assistance.        Care EveryWhere ID     This is your Care EveryWhere ID. This could be used by other organizations to access your [place] medical records  [id number]        Your Vitals Were     Pulse                   71            Blood Pressure from Last 3 Encounters:   10/08/18 118/80   10/05/18 117/80   10/03/18 132/82    Weight from Last 3 Encounters:   09/12/18 75.7 kg (166 lb 12.8 oz)   07/10/18 76.8 kg (169 lb 6.4 oz)              We  Performed the Following     C TRANSCRANIAL MAGNETIC STIMULATION TREATMENT,DELIVERY/MANAGEMENT        Primary Care Provider Office Phone # Fax #    [facility] [phone] [address]  [facility] [address]        Equal Access to Services     BIANCA KHAN : Hadii aad ku hadkendyo Soomaali, waaxda luqadaha, qaybta kaalmada adeegyada, waxay lloydin haybilln priscila ruiztkiitrey robertson. So [facility] [place] [phone].    ATENCIÓN: Si habla español, tiene a cole disposición servicios gratuitos de asistencia lingüística. Llame al [phone].    We comply with applicable federal civil rights laws and [place] laws. We do not discriminate on the basis of race, color, national origin, age, disability, sex, sexual orientation, or gender identity.            Thank you!     Thank you for choosing [facility] PSYCHIATRY  for your care. Our goal is always to provide you with excellent care. Hearing back from our patients is one way we can continue to improve our services. Please take a few minutes to complete the written survey that you may receive in the mail after your visit with us. Thank you!             Your Updated Medication List - Protect others around you: Learn how to safely use, store and throw away your medicines at www.disposemymeds.org.          This list is accurate as of 10/8/18  4:09 PM.  Always use your most recent med list.                   Brand Name Dispense Instructions for use Diagnosis    ALPRAZolam 0.5 MG tablet    XANAX     CANCEL PRIOR RX -- UPDATED DOSE INSTRUCTIONS -- Take one-half to one tab by mouth at bedtime as needed for anxiety.        amphetamine-dextroamphetamine 30 MG per 24 hr capsule    ADDERALL XR     Take 30 mg by mouth        buPROPion 150 MG 24 hr tablet    WELLBUTRIN XL     TAKE 2 TABLETS EVERY       MORNING        cholecalciferol 5000 units Caps capsule    vitamin D3          Fish Oil 435 MG Caps      Take 1,200 mg by mouth        ketorolac 60 MG/2ML  Soln injection    TORADOL     Inject 30-60 mg into the muscle        MULTIPLE VITAMINS-MINERALS PO           Octacosanol 1000-5 MCG-UNIT Tabs           PARoxetine 30 MG tablet    PAXIL     Take 60 mg by mouth

## 2018-10-09 ENCOUNTER — OFFICE VISIT (OUTPATIENT)
Dept: PSYCHIATRY | Facility: CLINIC | Age: 58
End: 2018-10-09
Payer: COMMERCIAL

## 2018-10-09 VITALS — DIASTOLIC BLOOD PRESSURE: 89 MMHG | HEART RATE: 97 BPM | SYSTOLIC BLOOD PRESSURE: 139 MMHG

## 2018-10-09 DIAGNOSIS — F33.2 SEVERE EPISODE OF RECURRENT MAJOR DEPRESSIVE DISORDER, WITHOUT PSYCHOTIC FEATURES (H): Primary | ICD-10-CM

## 2018-10-09 ASSESSMENT — PATIENT HEALTH QUESTIONNAIRE - PHQ9: SUM OF ALL RESPONSES TO PHQ QUESTIONS 1-9: 5

## 2018-10-09 NOTE — PROGRESS NOTES
[facility] TMS Program  [address]  [place] [address]  TMS Procedure Note   Samira Romero MRN# [id number]  Age: 58 year old year old YOB: 1960    Pre-Procedure:  History and Physical: Reviewed in medical record  Consent Signed by: Samira Romero  On: 09/12/18    Clinical Narrative:  Pt tolerating treatment.     Indications for TMS:  MDD, recurrent, severe; 4+ medication trials (from 2+ classes) ineffective; Psychotherapy ineffective.     Pre-Procedure Diagnosis:  MDD, recurrent, severe F33.2    Treatment Hx:  Treatment number this series: 17  Total lifetime treatment number: 17    Allergies   Allergen Reactions     Codeine Nausea and Vomiting     Penicillins Rash      /89 (BP Location: Right arm, Patient Position: Sitting, Cuff Size: Adult Regular)  Pulse 97    Pause for the Cause  Right patient:  Yes  Right procedure/correct coil:  Yes; rTMS; cpt 08865; H1 coil.   Earplugs in place:  Yes    Procedure  Patient was seated in procedure chair. Identity and procedure was verified. Ear plugs were placed in ears and patient-specific cap was placed on head and tightened appropriately. Ruler locations were verified. Coil was placed at treatment location and stimulator was set to parameters described below. A test train was delivered and pt tolerated train. Given pt tolerance, 55 treatment trains were delivered. Pt tolerated procedure with right facial and right hand movement.      Motor Threshold Determination  Distance from nasion to inion: 36 cm  MT 1: 0 - 7- 14.5 @ 49% on 9/12/2018  MT 2: 0 - 7- 14.5 @ 49% on 9/27/2018      Stimulation Parameters  Frequency: 18 Hz     Train duration: 2 sec  Total pulses delivered: 1980  Inter-train interval: 20 sec  Tx Loc: 0 - eyebrows  - 14.5  Energy: 58% (120% MT)  Trains: 55 trains      Post-Procedure Diagnosis:  MDD, recurrent, severe F33.2      Meghan Connell  [facility] Neuromodulation      Plan   -  Cont TMS  - ReMT Wednesday or Thursday     I didn t see the patient during/after treatment but remained available in the clinic during  treatment.    JONATHAN Snow MD  [facility] Neuromodulation

## 2018-10-09 NOTE — MR AVS SNAPSHOT
After Visit Summary   10/9/2018    Samira Romero    MRN: [id number]           Patient Information     Date Of Birth          1960        Visit Information        Provider Department      10/9/2018 1:45 PM ME [facility] PROCEDURE ROOM [facility] Psychiatry        Today's Diagnoses     Severe episode of recurrent major depressive disorder, without psychotic features (H)    -  1       Follow-ups after your visit        Your next 10 appointments already scheduled     Oct 22, 2018  1:45 PM CDT   TMS TREATMENT with ME [facility] PROCEDURE ROOM   [facility] ([facility])    [address]  [facility] [address]   [phone]            Oct 23, 2018  1:45 PM CDT   TMS TREATMENT with ME UMP PROCEDURE ROOM   [facility] ([facility])    [address]  [facility] [address]   [phone]            Oct 24, 2018  1:45 PM CDT   TMS TREATMENT with ME [facility]MP PROCEDURE ROOM   [facility] ([facility])    [address]  [facility] [address]   [phone]            Oct 25, 2018  1:45 PM CDT   TMS TREATMENT with ME [facility] PROCEDURE ROOM   [facility] ([facility])    [address]  [facility] [address]   [phone]            Oct 26, 2018  1:45 PM CDT   TMS TREATMENT with ME [facility] PROCEDURE ROOM   [facility] ([facility])    [address]  [facility] [address]   [phone]            Oct 29, 2018  1:45 PM CDT   TMS TREATMENT with ME [facility] PROCEDURE ROOM   [facility] ([facility])    [address]  [facility] [address]   [phone]            Oct 30, 2018  1:45 PM CDT   TMS TREATMENT with ME [facility]MP PROCEDURE ROOM   [facility] ([facility])    [address]  [facility] [address]   [phone]            Oct 31, 2018  1:45 PM CDT   TMS TREATMENT with ME UMP PROCEDURE ROOM   [facility]  Psychiatry ([facility])    [address]  [facility] [address] [phone]            Nov 01, 2018  1:45 PM CDT   TMS TREATMENT with ME [facility] PROCEDURE ROOM   [facility] Psychiatry ([facility])    [address]  [facility] [address] [phone]              Who to contact     Please call your clinic at [phone] to:    Ask questions about your health    Make or cancel appointments    Discuss your medicines    Learn about your test results    Speak to your doctor            Additional Information About Your Visit        [email or web address] Information     [employer] gives you secure access to your electronic health record. If you see a primary care provider, you can also send messages to your care team and make appointments. If you have questions, please call your primary care clinic.  If you do not have a primary care provider, please call [phone] and they will assist you.      [employer] is an electronic gateway that provides easy, online access to your medical records. With [employer], you can request a clinic appointment, read your test results, renew a prescription or communicate with your care team.     To access your existing account, please contact your [facility] or call [phone] for assistance.        Care EveryWhere ID     This is your Care EveryWhere ID. This could be used by other organizations to access your [place] medical records  [id number]        Your Vitals Were     Pulse                   97            Blood Pressure from Last 3 Encounters:   10/19/18 138/78   10/18/18 129/76   10/17/18 120/72    Weight from Last 3 Encounters:   09/12/18 75.7 kg (166 lb 12.8 oz)   07/10/18 76.8 kg (169 lb 6.4 oz)              We Performed the Following     C TRANSCRANIAL MAGNETIC STIMULATION TREATMENT,DELIVERY/MANAGEMENT        Primary Care Provider Office Phone # Fax #    [facility] [phone]  [phone]       [address]  [facility] [address]        Equal Access to Services     BIANCA KHAN : Hadii aad ku hadkendyo Socassandra, waaxda luqadaha, qaybta kaalmada adevenkatada, jaimee lloydin hayaabeth lamavalentino romero jade robertson. So [facility] [phone].    ATENCIÓN: Si habla español, tiene a cole disposición servicios gratuitos de asistencia lingüística. Rl al [phone].    We comply with applicable federal civil rights laws and [place] laws. We do not discriminate on the basis of race, color, national origin, age, disability, sex, sexual orientation, or gender identity.            Thank you!     Thank you for choosing [facility] PSY[facility]IATRY  for your care. Our goal is always to provide you with excellent care. Hearing back from our patients is one way we can continue to improve our services. Please take a few minutes to complete the written survey that you may receive in the mail after your visit with us. Thank you!             Your Updated Medication List - Protect others around you: Learn how to safely use, store and throw away your medicines at www.disposemymeds.org.          This list is accurate as of 10/9/18 11:59 PM.  Always use your most recent med list.                   Brand Name Dispense Instructions for use Diagnosis    ALPRAZolam 0.5 MG tablet    XANAX     CANCEL PRIOR RX -- UPDATED DOSE INSTRUCTIONS -- Take one-half to one tab by mouth at bedtime as needed for anxiety.        amphetamine-dextroamphetamine 30 MG per 24 hr capsule    ADDERALL XR     Take 30 mg by mouth        buPROPion 150 MG 24 hr tablet    WELLBUTRIN XL     TAKE 2 TABLETS EVERY       MORNING        cholecalciferol 5000 units Caps capsule    vitamin D3          Fish Oil 435 MG Caps      Take 1,200 mg by mouth        ketorolac 60 MG/2ML Soln injection    TORADOL     Inject 30-60 mg into the muscle        MULTIPLE VITAMINS-MINERALS PO           Octacosanol 1000-5 MCG-UNIT Tabs           PARoxetine 30 MG tablet    PAXIL     Take  60 mg by mouth

## 2018-10-10 ENCOUNTER — OFFICE VISIT (OUTPATIENT)
Dept: PSYCHIATRY | Facility: CLINIC | Age: 58
End: 2018-10-10
Payer: COMMERCIAL

## 2018-10-10 VITALS — SYSTOLIC BLOOD PRESSURE: 135 MMHG | DIASTOLIC BLOOD PRESSURE: 85 MMHG | HEART RATE: 96 BPM

## 2018-10-10 DIAGNOSIS — F33.2 SEVERE EPISODE OF RECURRENT MAJOR DEPRESSIVE DISORDER, WITHOUT PSYCHOTIC FEATURES (H): Primary | ICD-10-CM

## 2018-10-10 ASSESSMENT — PATIENT HEALTH QUESTIONNAIRE - PHQ9: SUM OF ALL RESPONSES TO PHQ QUESTIONS 1-9: 5

## 2018-10-10 NOTE — PROGRESS NOTES
[facility] TMS Program  [address]  [place] [address]  TMS Procedure Note   Samira Romero MRN# [id number]  Age: 58 year old year old YOB: 1960    Pre-Procedure:  History and Physical: Reviewed in medical record  Consent Signed by: Samira Romero  On: 09/12/18    Clinical Narrative:  Pt tolerating treatment.     Indications for TMS:  MDD, recurrent, severe; 4+ medication trials (from 2+ classes) ineffective; Psychotherapy ineffective.     Pre-Procedure Diagnosis:  MDD, recurrent, severe F33.2    Treatment Hx:  Treatment number this series: 18  Total lifetime treatment number: 18    Allergies   Allergen Reactions     Codeine Nausea and Vomiting     Penicillins Rash      /85 (BP Location: Right arm, Patient Position: Sitting, Cuff Size: Adult Regular)  Pulse 96    Pause for the Cause  Right patient:  Yes  Right procedure/correct coil:  Yes; rTMS; cpt 55480; H1 coil.   Earplugs in place:  Yes    Procedure  Patient was seated in procedure chair. Identity and procedure was verified. Ear plugs were placed in ears and patient-specific cap was placed on head and tightened appropriately. Ruler locations were verified. Coil was placed at treatment location and stimulator was set to parameters described below. A test train was delivered and pt tolerated train. Given pt tolerance, 55 treatment trains were delivered. Pt tolerated procedure with right facial and right hand movement.      Motor Threshold Determination  Distance from nasion to inion: 36 cm  MT 1: 0 - 7- 14.5 @ 49% on 9/12/2018  MT 2: 0 - 7- 14.5 @ 49% on 9/27/2018      Stimulation Parameters  Frequency: 18 Hz     Train duration: 2 sec  Total pulses delivered: 1980  Inter-train interval: 20 sec  Tx Loc: 0 - eyebrows  - 14.5  Energy: 58% (120% MT)  Trains: 55 trains      Post-Procedure Diagnosis:  MDD, recurrent, severe F33.2      Cleve Garcia  [facility] Neuromodulation      Plan   - Cont  TMS  - ReMT Thursday     I didn t see the patient during/after treatment but remained available in the clinic during  treatment.    Alina Mcintyre MD  [facility] Neuromodulation

## 2018-10-10 NOTE — MR AVS SNAPSHOT
After Visit Summary   10/10/2018    Samira Romero    MRN: [id number]           Patient Information     Date Of Birth          1960        Visit Information        Provider Department      10/10/2018 1:45 PM ME [facility] PROCEDURE ROOM [facility] Psychiatry        Today's Diagnoses     Severe episode of recurrent major depressive disorder, without psychotic features (H)    -  1       Follow-ups after your visit        Your next 10 appointments already scheduled     Oct 11, 2018  1:45 PM CDT   TMS TREATMENT with ME [facility] PROCEDURE ROOM   [facility] ([facility])    [address]  [facility] [address]   [phone]            Oct 12, 2018  1:45 PM CDT   TMS TREATMENT with ME [facility] PROCEDURE ROOM   [facility] ([facility])    [address]  [facility] [address]   [phone]            Oct 15, 2018  1:45 PM CDT   TMS TREATMENT with ME [facility] PROCEDURE ROOM   [facility] ([facility])    [address]  [facility] [address]   [phone]            Oct 16, 2018  1:45 PM CDT   TMS TREATMENT with ME [facility] PROCEDURE ROOM   [facility] ([facility])    [address]  [facility] [address]   [phone]            Oct 17, 2018  1:45 PM CDT   TMS TREATMENT with ME [facility] PROCEDURE ROOM   [facility] ([facility])    [address]  [facility] [address]   [phone]            Oct 18, 2018  1:45 PM CDT   TMS TREATMENT with ME [facility] PROCEDURE ROOM   [facility] ([facility])    [address]  [facility] [address]   [phone]            Oct 19, 2018  1:45 PM CDT   TMS TREATMENT with ME [facility] PROCEDURE ROOM   [facility] ([facility])    [address]  [facility] [address]   [phone]            Oct 22, 2018  1:45 PM CDT   TMS TREATMENT with ME [facility]MP PROCEDURE ROOM   [facility]  Psychiatry ([facility])    [address]  [facility] [address]   [phone]            Oct 23, 2018  1:45 PM CDT   TMS TREATMENT with ME UMP PROCEDURE ROOM   [facility] Psychiatry ([facility])    [address]  [facility] [address] [phone]            Oct 24, 2018  1:45 PM CDT   TMS TREATMENT with ME UMP PROCEDURE ROOM   [facility] Psychiatry ([facility])    [address]  [facility] [address] [phone]              Who to contact     Please call your clinic at [phone] to:    Ask questions about your health    Make or cancel appointments    Discuss your medicines    Learn about your test results    Speak to your doctor            Additional Information About Your Visit        [employer] Information     [employer] gives you secure access to your electronic health record. If you see a primary care provider, you can also send messages to your care team and make appointments. If you have questions, please call your primary care clinic.  If you do not have a primary care provider, please call [phone] and they will assist you.      [employer] is an electronic gateway that provides easy, online access to your medical records. With [employer], you can request a clinic appointment, read your test results, renew a prescription or communicate with your care team.     To access your existing account, please contact your [facility] or call [phone] for assistance.        Care EveryWhere ID     This is your Care EveryWhere ID. This could be used by other organizations to access your [place] medical records  [id number]        Your Vitals Were     Pulse                   96            Blood Pressure from Last 3 Encounters:   10/10/18 135/85   10/09/18 139/89   10/08/18 118/80    Weight from Last 3 Encounters:   09/12/18 75.7 kg (166 lb 12.8 oz)   07/10/18 76.8 kg (169 lb 6.4 oz)              We  Performed the Following     C TRANSCRANIAL MAGNETIC STIMULATION TREATMENT,DELIVERY/MANAGEMENT        Primary Care Provider Office Phone # Fax #    [facility] [phone] [address]  [facility] [address]        Equal Access to Services     BIANCA KHAN : Hadii aad ku hadkendyo Soomaali, waaxda luqadaha, qaybta kaalmada adeegyada, waxay lloydin haybilln priscila ruiztikitrey robertson. So [facility] [phone].    ATENCIÓN: Si habla español, tiene a cole disposición servicios gratuitos de asistencia lingüística. Llame al [phone].    We comply with applicable federal civil rights laws and [place] laws. We do not discriminate on the basis of race, color, national origin, age, disability, sex, sexual orientation, or gender identity.            Thank you!     Thank you for choosing [facility] PSYCHIATRY  for your care. Our goal is always to provide you with excellent care. Hearing back from our patients is one way we can continue to improve our services. Please take a few minutes to complete the written survey that you may receive in the mail after your visit with us. Thank you!             Your Updated Medication List - Protect others around you: Learn how to safely use, store and throw away your medicines at www.disposemymeds.org.          This list is accurate as of 10/10/18  3:34 PM.  Always use your most recent med list.                   Brand Name Dispense Instructions for use Diagnosis    ALPRAZolam 0.5 MG tablet    XANAX     CANCEL PRIOR RX -- UPDATED DOSE INSTRUCTIONS -- Take one-half to one tab by mouth at bedtime as needed for anxiety.        amphetamine-dextroamphetamine 30 MG per 24 hr capsule    ADDERALL XR     Take 30 mg by mouth        buPROPion 150 MG 24 hr tablet    WELLBUTRIN XL     TAKE 2 TABLETS EVERY       MORNING        cholecalciferol 5000 units Caps capsule    vitamin D3          Fish Oil 435 MG Caps      Take 1,200 mg by mouth        ketorolac 60 MG/2ML  Soln injection    TORADOL     Inject 30-60 mg into the muscle        MULTIPLE VITAMINS-MINERALS PO           Octacosanol 1000-5 MCG-UNIT Tabs           PARoxetine 30 MG tablet    PAXIL     Take 60 mg by mouth

## 2018-10-11 ENCOUNTER — OFFICE VISIT (OUTPATIENT)
Dept: PSYCHIATRY | Facility: CLINIC | Age: 58
End: 2018-10-11
Payer: COMMERCIAL

## 2018-10-11 VITALS — DIASTOLIC BLOOD PRESSURE: 89 MMHG | SYSTOLIC BLOOD PRESSURE: 135 MMHG | HEART RATE: 76 BPM

## 2018-10-11 DIAGNOSIS — F33.2 SEVERE EPISODE OF RECURRENT MAJOR DEPRESSIVE DISORDER, WITHOUT PSYCHOTIC FEATURES (H): Primary | ICD-10-CM

## 2018-10-11 ASSESSMENT — PATIENT HEALTH QUESTIONNAIRE - PHQ9: SUM OF ALL RESPONSES TO PHQ QUESTIONS 1-9: 5

## 2018-10-11 NOTE — PROGRESS NOTES
[facility] TMS Program  [address], [address]  [place] [address]  TMS Procedure Note   Samira Romero MRN# [id number]  Age: 58 year old year old YOB: 1960    Pre-Procedure:  History and Physical: Reviewed in medical record  Consent Signed by: Samira Romero  On: 09/12/18    Clinical Narrative:  Pt tolerating treatment.     Indications for TMS:  MDD, recurrent, severe; 4+ medication trials (from 2+ classes) ineffective; Psychotherapy ineffective.     Pre-Procedure Diagnosis:  MDD, recurrent, severe F33.2    Treatment Hx:  Treatment number this series: 19  Total lifetime treatment number: 19    Allergies   Allergen Reactions     Codeine Nausea and Vomiting     Penicillins Rash      /89 (BP Location: Right arm, Patient Position: Sitting, Cuff Size: Adult Regular)  Pulse 76    Pause for the Cause  Right patient:  Yes  Right procedure/correct coil:  Yes; rTMS; cpt 06593; H1 coil.   Earplugs in place:  Yes    Procedure  Patient was seated in procedure chair. Identity and procedure was verified. Ear plugs were placed in ears and patient-specific cap was placed on head and tightened appropriately. Ruler locations were verified. Coil was placed at initial MT location and stimulator was set to initial MT. MT was retested and found as described below. Coil was placed at treatment location and stimulator was set to stimulation parameters detailed below. A test train was delivered and pt tolerated train fine. Given pt tolerance, 55 trains were delivered. Pt tolerated procedure with facial and right hand movement.      Motor Threshold Determination  Distance from nasion to inion: 36 cm  MT 1: 0 - 7- 14.5 @ 49% on 9/12/2018  MT 2: 0 - 7- 14.5 @ 49% on 9/27/2018  MT 3: 0 - 7- 14.5 @ 47% on 10/11/2018    Stimulation Parameters  Frequency: 18 Hz     Train duration: 2 sec  Total pulses delivered: 1980  Inter-train interval: 20 sec  Tx Loc: 0 - eyebrows  - 14.5  Energy: 56% (120% MT)  Trains: 55  trains      Post-Procedure Diagnosis:  MDD, recurrent, severe F33.2      Meghan Connell  [facility]  [facility] Neuromodulation      Plan   - Cont TMS     I completed repeat determination of the pt's motor threshold during the visit today. I was available in the clinic throughout the treatment.      JONATHAN Snow MD  [facility] Neuromodulation

## 2018-10-11 NOTE — MR AVS SNAPSHOT
After Visit Summary   10/11/2018    Samira Romero    MRN: [id number]           Patient Information     Date Of Birth          1960        Visit Information        Provider Department      10/11/2018 1:45 PM ME [facility] PROCEDURE ROOM [facility] Psychiatry        Today's Diagnoses     Severe episode of recurrent major depressive disorder, without psychotic features (H)    -  1       Follow-ups after your visit        Your next 10 appointments already scheduled     Oct 12, 2018  1:45 PM CDT   TMS TREATMENT with ME [facility] PROCEDURE ROOM   [facility] Psychiatry ([facility])    [address]  [facility] [address]   [phone]            Oct 15, 2018  1:45 PM CDT   TMS TREATMENT with ME [facility] PROCEDURE ROOM   [facility] ([facility])    [address]  [facility] [address]   [phone]            Oct 16, 2018  1:45 PM CDT   TMS TREATMENT with ME [facility]MP PROCEDURE ROOM   [facility] ([facility])    [address]  [facility] [address]   [phone]            Oct 17, 2018  1:45 PM CDT   TMS TREATMENT with ME [facility]MP PROCEDURE ROOM   [facility] ([facility])    [address]  [facility] [address]   [phone]            Oct 18, 2018  1:45 PM CDT   TMS TREATMENT with ME [facility] PROCEDURE ROOM   [facility] ([facility])    [address]  [facility] [address]   [phone]            Oct 19, 2018  1:45 PM CDT   TMS TREATMENT with ME [facility]MP PROCEDURE ROOM   [facility] ([facility])    [address]  [facility] [address]   [phone]            Oct 22, 2018  1:45 PM CDT   TMS TREATMENT with ME [facility]MP PROCEDURE ROOM   [facility] ([facility])    [address]  [facility] [address]   [phone]            Oct 23, 2018  1:45 PM CDT   TMS TREATMENT with ME UMP PROCEDURE ROOM   [facility]  Psychiatry ([facility])    [address]  [facility] [address]   [phone]            Oct 24, 2018  1:45 PM CDT   TMS TREATMENT with ME UMP PROCEDURE ROOM   [facility] Psychiatry ([facility])    [address]  [facility] [address] [phone]            Oct 25, 2018  1:45 PM CDT   TMS TREATMENT with ME UMP PROCEDURE ROOM   [facility] Psychiatry ([facility])    [address]  [facility] [address] [phone]              Who to contact     Please call your clinic at [phone] to:    Ask questions about your health    Make or cancel appointments    Discuss your medicines    Learn about your test results    Speak to your doctor            Additional Information About Your Visit        [employer] Information     [employer] gives you secure access to your electronic health record. If you see a primary care provider, you can also send messages to your care team and make appointments. If you have questions, please call your primary care clinic.  If you do not have a primary care provider, please call [phone] and they will assist you.      Genome is an electronic gateway that provides easy, online access to your medical records. With [employer], you can request a clinic appointment, read your test results, renew a prescription or communicate with your care team.     To access your existing account, please contact your [facility] or call [phone] for assistance.        Care EveryWhere ID     This is your Care EveryWhere ID. This could be used by other organizations to access your [place] medical records  [id number]        Your Vitals Were     Pulse                   76            Blood Pressure from Last 3 Encounters:   10/11/18 135/89   10/10/18 135/85   10/09/18 139/89    Weight from Last 3 Encounters:   09/12/18 75.7 kg (166 lb 12.8 oz)   07/10/18 76.8 kg (169 lb 6.4 oz)              We  Performed the Following     C REPET TMS TX SUBSEQ MOTR THRESHLD W/DELIV & MNGT        Primary Care Provider Office Phone # Fax #    [facility] [phone] [address] [facility] [address]        Equal Access to Services     BIANCA KHAN : Hadii aad ku hadasho Soomaali, waaxda luqadaha, qaybta kaalmada adeegyada, waxay idiin hayaan adeeg nick lalee . So [facility] [phone].    ATENCIÓN: Si habla español, tiene a cole disposición servicios gratuitos de asistencia lingüística. [facility] [phone].    We comply with applicable federal civil rights laws and [place] laws. We do not discriminate on the basis of race, color, national origin, age, disability, sex, sexual orientation, or gender identity.            Thank you!     Thank you for choosing [facility] PSYCHIATRY  for your care. Our goal is always to provide you with excellent care. Hearing back from our patients is one way we can continue to improve our services. Please take a few minutes to complete the written survey that you may receive in the mail after your visit with us. Thank you!             Your Updated Medication List - Protect others around you: Learn how to safely use, store and throw away your medicines at www.disposemymeds.org.          This list is accurate as of 10/11/18  2:56 PM.  Always use your most recent med list.                   Brand Name Dispense Instructions for use Diagnosis    ALPRAZolam 0.5 MG tablet    XANAX     CANCEL PRIOR RX -- UPDATED DOSE INSTRUCTIONS -- Take one-half to one tab by mouth at bedtime as needed for anxiety.        amphetamine-dextroamphetamine 30 MG per 24 hr capsule    ADDERALL XR     Take 30 mg by mouth        buPROPion 150 MG 24 hr tablet    WELLBUTRIN XL     TAKE 2 TABLETS EVERY       MORNING        cholecalciferol 5000 units Caps capsule    vitamin D3          Fish Oil 435 MG Caps      Take 1,200 mg by mouth        ketorolac 60 MG/2ML Soln injection     TORADOL     Inject 30-60 mg into the muscle        MULTIPLE VITAMINS-MINERALS PO           Octacosanol 1000-5 MCG-UNIT Tabs           PARoxetine 30 MG tablet    PAXIL     Take 60 mg by mouth

## 2018-10-12 ENCOUNTER — OFFICE VISIT (OUTPATIENT)
Dept: PSYCHIATRY | Facility: CLINIC | Age: 58
End: 2018-10-12
Payer: COMMERCIAL

## 2018-10-12 VITALS — HEART RATE: 79 BPM | DIASTOLIC BLOOD PRESSURE: 80 MMHG | SYSTOLIC BLOOD PRESSURE: 122 MMHG

## 2018-10-12 DIAGNOSIS — F33.2 SEVERE EPISODE OF RECURRENT MAJOR DEPRESSIVE DISORDER, WITHOUT PSYCHOTIC FEATURES (H): Primary | ICD-10-CM

## 2018-10-12 ASSESSMENT — PATIENT HEALTH QUESTIONNAIRE - PHQ9: SUM OF ALL RESPONSES TO PHQ QUESTIONS 1-9: 5

## 2018-10-12 NOTE — PROGRESS NOTES
[facility] TMS Program  [address], [address]  [place] [address]  TMS Procedure Note   Samira Romero MRN# [id number]  Age: 58 year old year old YOB: 1960    Pre-Procedure:  History and Physical: Reviewed in medical record  Consent Signed by: Samira Romero  On: 09/12/18    Clinical Narrative:  Pt tolerating treatment.  IDSSR=25    Indications for TMS:  MDD, recurrent, severe; 4+ medication trials (from 2+ classes) ineffective; Psychotherapy ineffective.     Pre-Procedure Diagnosis:  MDD, recurrent, severe F33.2    Treatment Hx:  Treatment number this series: 20  Total lifetime treatment number: 20    Allergies   Allergen Reactions     Codeine Nausea and Vomiting     Penicillins Rash      /80 (BP Location: Left arm, Patient Position: Sitting, Cuff Size: Adult Regular)  Pulse 79    Pause for the Cause  Right patient:  Yes  Right procedure/correct coil:  Yes; rTMS; cpt 03653; H1 coil.   Earplugs in place:  Yes    Procedure  Patient was seated in procedure chair. Identity and procedure was verified. Ear plugs were placed in ears and patient-specific cap was placed on head and tightened appropriately. Ruler locations were verified. Coil was placed at initial MT location and stimulator was set to initial MT. MT was retested and found as described below. Coil was placed at treatment location and stimulator was set to stimulation parameters detailed below. A test train was delivered and pt tolerated train fine. Given pt tolerance, 55 trains were delivered. Pt tolerated procedure with facial and right hand movement.      Motor Threshold Determination  Distance from nasion to inion: 36 cm  MT 1: 0 - 7- 14.5 @ 49% on 9/12/2018  MT 2: 0 - 7- 14.5 @ 49% on 9/27/2018  MT 3: 0 - 7- 14.5 @ 47% on 10/11/2018    Stimulation Parameters  Frequency: 18 Hz     Train duration: 2 sec  Total pulses delivered: 1980  Inter-train interval: 20 sec  Tx Loc: 0 - eyebrows  - 14.5  Energy: 56% (120%  MT)  Trains: 55 trains      Post-Procedure Diagnosis:  MDD, recurrent, severe F33.2      Yanci Goss RN   [facility] Neuromodulation      Plan   - Cont TMS    I did not see patient today but I was available in the clinic throughout the treatment.      Jesus Carlson MD  [facility] Neuromodulation

## 2018-10-12 NOTE — MR AVS SNAPSHOT
After Visit Summary   10/12/2018    Samira Romero    MRN: [id number]           Patient Information     Date Of Birth          1960        Visit Information        Provider Department      10/12/2018 1:45 PM ME [facility] PROCEDURE ROOM [facility] Psychiatry        Today's Diagnoses     Severe episode of recurrent major depressive disorder, without psychotic features (H)    -  1       Follow-ups after your visit        Your next 10 appointments already scheduled     Oct 15, 2018  1:45 PM CDT   TMS TREATMENT with ME [facility] PROCEDURE ROOM   [facility] Psychiatry ([facility])    [address]  [facility] [address]   [phone]            Oct 16, 2018  1:45 PM CDT   TMS TREATMENT with ME [facility] PROCEDURE ROOM   [facility] ([facility])    [address]  [facility] [address]   [phone]            Oct 17, 2018  1:45 PM CDT   TMS TREATMENT with ME UMP PROCEDURE ROOM   [facility] ([facility])    [address]  [facility] [address]   [phone]            Oct 18, 2018  1:45 PM CDT   TMS TREATMENT with ME UMP PROCEDURE ROOM   [facility] ([facility])    [address]  [facility] [address]   [phone]            Oct 19, 2018  1:45 PM CDT   TMS TREATMENT with ME [facility]MP PROCEDURE ROOM   [facility] ([facility])    [address]  [facility] [address]   [phone]            Oct 22, 2018  1:45 PM CDT   TMS TREATMENT with ME UMP PROCEDURE ROOM   [facility] ([facility])    [address]  [facility] [address]   [phone]            Oct 23, 2018  1:45 PM CDT   TMS TREATMENT with ME UMP PROCEDURE ROOM   [facility] ([facility])    [address]  [facility] [address]   [phone]            Oct 24, 2018  1:45 PM CDT   TMS TREATMENT with ME UMP PROCEDURE ROOM   [facility]  Psychiatry ([facility])    [address]  [facility] [address]   [phone]            Oct 25, 2018  1:45 PM CDT   TMS TREATMENT with ME UMP PROCEDURE ROOM   [facility] Psychiatry ([facility])    [address]  [facility] [address] [phone]            Oct 26, 2018  1:45 PM CDT   TMS TREATMENT with ME UMP PROCEDURE ROOM   [facility] Psychiatry ([facility])    [address]  [facility] [address] [phone]              Who to contact     Please call your clinic at [phone] to:    Ask questions about your health    Make or cancel appointments    Discuss your medicines    Learn about your test results    Speak to your doctor            Additional Information About Your Visit        [employer]har[employer] Information     [employer] gives you secure access to your electronic health record. If you see a primary care provider, you can also send messages to your care team and make appointments. If you have questions, please call your primary care clinic.  If you do not have a primary care provider, please call [phone] and they will assist you.      [employer] is an electronic gateway that provides easy, online access to your medical records. With [employer], you can request a clinic appointment, read your test results, renew a prescription or communicate with your care team.     To access your existing account, please contact your [facility] or call [phone] for assistance.        Care EveryWhere ID     This is your Care EveryWhere ID. This could be used by other organizations to access your [place] medical records  [id number]        Your Vitals Were     Pulse                   79            Blood Pressure from Last 3 Encounters:   10/12/18 122/80   10/11/18 135/89   10/10/18 135/85    Weight from Last 3 Encounters:   09/12/18 75.7 kg (166 lb 12.8 oz)   07/10/18 76.8 kg (169 lb 6.4 oz)              We  Performed the Following     C TRANSCRANIAL MAGNETIC STIMULATION TREATMENT,DELIVERY/MANAGEMENT        Primary Care Provider Office Phone # Fax #    [facility] [phone] [address]  [facility] [address]        Equal Access to Services     BIANCA KHAN : Hadii aad ku hadkendyo Soomaali, waaxda luqadaha, qaybta kaalmada adeegyada, waxay lloydin haybilln priscila ruiztikitrey robertson. So [facility] [phone].    ATENCIÓN: Si habla español, tiene a cole disposición servicios gratuitos de asistencia lingüística. Llame al [phone].    We comply with applicable federal civil rights laws and [place] laws. We do not discriminate on the basis of race, color, national origin, age, disability, sex, sexual orientation, or gender identity.            Thank you!     Thank you for choosing [facility] PSYCHIATRY  for your care. Our goal is always to provide you with excellent care. Hearing back from our patients is one way we can continue to improve our services. Please take a few minutes to complete the written survey that you may receive in the mail after your visit with us. Thank you!             Your Updated Medication List - Protect others around you: Learn how to safely use, store and throw away your medicines at www.disposemymeds.org.          This list is accurate as of 10/12/18  3:45 PM.  Always use your most recent med list.                   Brand Name Dispense Instructions for use Diagnosis    ALPRAZolam 0.5 MG tablet    XANAX     CANCEL PRIOR RX -- UPDATED DOSE INSTRUCTIONS -- Take one-half to one tab by mouth at bedtime as needed for anxiety.        amphetamine-dextroamphetamine 30 MG per 24 hr capsule    ADDERALL XR     Take 30 mg by mouth        buPROPion 150 MG 24 hr tablet    WELLBUTRIN XL     TAKE 2 TABLETS EVERY       MORNING        cholecalciferol 5000 units Caps capsule    vitamin D3          Fish Oil 435 MG Caps      Take 1,200 mg by mouth        ketorolac 60 MG/2ML  Soln injection    TORADOL     Inject 30-60 mg into the muscle        MULTIPLE VITAMINS-MINERALS PO           Octacosanol 1000-5 MCG-UNIT Tabs           PARoxetine 30 MG tablet    PAXIL     Take 60 mg by mouth

## 2018-10-13 ASSESSMENT — PATIENT HEALTH QUESTIONNAIRE - PHQ9: SUM OF ALL RESPONSES TO PHQ QUESTIONS 1-9: 5

## 2018-10-15 ENCOUNTER — OFFICE VISIT (OUTPATIENT)
Dept: PSYCHIATRY | Facility: CLINIC | Age: 58
End: 2018-10-15
Payer: COMMERCIAL

## 2018-10-15 VITALS — HEART RATE: 83 BPM | DIASTOLIC BLOOD PRESSURE: 84 MMHG | SYSTOLIC BLOOD PRESSURE: 138 MMHG

## 2018-10-15 DIAGNOSIS — F33.2 SEVERE EPISODE OF RECURRENT MAJOR DEPRESSIVE DISORDER, WITHOUT PSYCHOTIC FEATURES (H): Primary | ICD-10-CM

## 2018-10-15 NOTE — PROGRESS NOTES
[facility] TMS Program  [address], [address]  [place] [address]  TMS Procedure Note   Samira Romero MRN# [id number]  Age: 58 year old year old YOB: 1960    Pre-Procedure:  History and Physical: Reviewed in medical record  Consent Signed by: Samira Romero  On: 09/12/18    Clinical Narrative:  Pt tolerating treatment.      Indications for TMS:  MDD, recurrent, severe; 4+ medication trials (from 2+ classes) ineffective; Psychotherapy ineffective.     Pre-Procedure Diagnosis:  MDD, recurrent, severe F33.2    Treatment Hx:  Treatment number this series: 21  Total lifetime treatment number: 21    Allergies   Allergen Reactions     Codeine Nausea and Vomiting     Penicillins Rash      /84 (BP Location: Right arm, Patient Position: Sitting, Cuff Size: Adult Regular)  Pulse 83    Pause for the Cause  Right patient:  Yes  Right procedure/correct coil:  Yes; rTMS; cpt 76950; H1 coil.   Earplugs in place:  Yes    Procedure  Patient was seated in procedure chair. Identity and procedure was verified. Ear plugs were placed in ears and patient-specific cap was placed on head and tightened appropriately. Ruler locations were verified. Coil was placed at treatment location and stimulator was set to parameters described below. A test train was delivered and pt tolerated train. Given pt tolerance, 55 treatment trains were delivered. Pt tolerated procedure with facial and right hand movement.      Motor Threshold Determination  Distance from nasion to inion: 36 cm  MT 1: 0 - 7- 14.5 @ 49% on 9/12/2018  MT 2: 0 - 7- 14.5 @ 49% on 9/27/2018  MT 3: 0 - 7- 14.5 @ 47% on 10/11/2018    Stimulation Parameters  Frequency: 18 Hz     Train duration: 2 sec  Total pulses delivered: 1980  Inter-train interval: 20 sec  Tx Loc: 0 - eyebrows  - 14.5  Energy: 56% (120% MT)  Trains: 55 trains      Post-Procedure Diagnosis:  MDD, recurrent, severe F33.2      Meghan Connell  [facility]  Neuromodulation      Plan   - Cont TMS     I did not see patient before/during treatment. I was available in the clinic throughout the treatment.      Jesus Carlson MD  [facility] Neuromodulation

## 2018-10-15 NOTE — MR AVS SNAPSHOT
After Visit Summary   10/15/2018    Samira Romero    MRN: [id number]           Patient Information     Date Of Birth          1960        Visit Information        Provider Department      10/15/2018 1:45 PM ME [facility] PROCEDURE ROOM [facility] Psych[facility]        Today's Diagnoses     Severe episode of recurrent major depressive disorder, without psychotic features (H)    -  1       Follow-ups after your visit        Your next 10 appointments already scheduled     Oct 16, 2018  1:45 PM CDT   TMS TREATMENT with ME [facility] PROCEDURE ROOM   [facility] ([facility])    [address]  [facility] [address]   [phone]            Oct 17, 2018  1:45 PM CDT   TMS TREATMENT with ME [facility]MP PROCEDURE ROOM   [facility] ([facility])    [address]  [facility] [address]   [phone]            Oct 18, 2018  1:45 PM CDT   TMS TREATMENT with ME [facility]MP PROCEDURE ROOM   [facility] ([facility])    [address]  [facility] [address]   [phone]            Oct 19, 2018  1:45 PM CDT   TMS TREATMENT with ME [facility] PROCEDURE ROOM   [facility] ([facility])    [address]  [facility] [address]   [phone]            Oct 22, 2018  1:45 PM CDT   TMS TREATMENT with ME [facility] PROCEDURE ROOM   [facility] ([facility])    [address]  [facility] [address]   [phone]            Oct 23, 2018  1:45 PM CDT   TMS TREATMENT with ME [facility] PROCEDURE ROOM   [facility] ([facility])    [address]  [facility] [address]   [phone]            Oct 24, 2018  1:45 PM CDT   TMS TREATMENT with ME [facility]MP PROCEDURE ROOM   [facility] ([facility])    [address]  [facility] [address]   [phone]            Oct 25, 2018  1:45 PM CDT   TMS TREATMENT with ME UMP PROCEDURE ROOM   [facility] ([facility])    [address]  [facility] [address]   [phone]            Oct 26, 2018  1:45 PM CDT   TMS TREATMENT with ME UMP PROCEDURE ROOM   [facility] Psychiatry ([facility])    [address]  [facility] [address] [phone]            Oct 29, 2018  1:45 PM CDT   TMS TREATMENT with ME UMP PROCEDURE ROOM   [facility] Psychiatry ([facility])    [address]  [facility] [address] [phone]              Who to contact     Please call your clinic at [phone] to:    Ask questions about your health    Make or cancel appointments    Discuss your medicines    Learn about your test results    Speak to your doctor            Additional Information About Your Visit        [employer] Information     [employer] gives you secure access to your electronic health record. If you see a primary care provider, you can also send messages to your care team and make appointments. If you have questions, please call your primary care clinic.  If you do not have a primary care provider, please call [phone] and they will assist you.      [employer] is an electronic gateway that provides easy, online access to your medical records. With [employer], you can request a clinic appointment, read your test results, renew a prescription or communicate with your care team.     To access your existing account, please contact your [facility] or call [phone] for assistance.        Care EveryWhere ID     This is your Care EveryWhere ID. This could be used by other organizations to access your [place] medical records  [id number]        Your Vitals Were     Pulse                   83            Blood Pressure from Last 3 Encounters:   10/15/18 138/84   10/12/18 122/80   10/11/18 135/89    Weight from Last 3 Encounters:   09/12/18 75.7 kg (166 lb 12.8 oz)   07/10/18 76.8 kg (169 lb 6.4 oz)              We  Performed the Following     C TRANSCRANIAL MAGNETIC STIMULATION TREATMENT,DELIVERY/MANAGEMENT        Primary Care Provider Office Phone # Fax #    [facility] [phone] [address]  [facility] [address]        Equal Access to Services     BIANCA KHAN : Hadii aad ku hadkendyo Soomaali, waaxda luqadaha, qaybta kaalmada adeegyada, waxay lloydin haybilln priscila ruiztikitrey robertson. So [facility] [phone].    ATENCIÓN: Si habla español, tiene a cole disposición servicios gratuitos de asistencia lingüística. Llame al [phone].    We comply with applicable federal civil rights laws and [place] laws. We do not discriminate on the basis of race, color, national origin, age, disability, sex, sexual orientation, or gender identity.            Thank you!     Thank you for choosing [facility] PSYCHIATRY  for your care. Our goal is always to provide you with excellent care. Hearing back from our patients is one way we can continue to improve our services. Please take a few minutes to complete the written survey that you may receive in the mail after your visit with us. Thank you!             Your Updated Medication List - Protect others around you: Learn how to safely use, store and throw away your medicines at www.disposemymeds.org.          This list is accurate as of 10/15/18  2:46 PM.  Always use your most recent med list.                   Brand Name Dispense Instructions for use Diagnosis    ALPRAZolam 0.5 MG tablet    XANAX     CANCEL PRIOR RX -- UPDATED DOSE INSTRUCTIONS -- Take one-half to one tab by mouth at bedtime as needed for anxiety.        amphetamine-dextroamphetamine 30 MG per 24 hr capsule    ADDERALL XR     Take 30 mg by mouth        buPROPion 150 MG 24 hr tablet    WELLBUTRIN XL     TAKE 2 TABLETS EVERY       MORNING        cholecalciferol 5000 units Caps capsule    vitamin D3          Fish Oil 435 MG Caps      Take 1,200 mg by mouth        ketorolac 60 MG/2ML  Soln injection    TORADOL     Inject 30-60 mg into the muscle        MULTIPLE VITAMINS-MINERALS PO           Octacosanol 1000-5 MCG-UNIT Tabs           PARoxetine 30 MG tablet    PAXIL     Take 60 mg by mouth

## 2018-10-16 ENCOUNTER — OFFICE VISIT (OUTPATIENT)
Dept: PSYCHIATRY | Facility: CLINIC | Age: 58
End: 2018-10-16
Payer: COMMERCIAL

## 2018-10-16 VITALS — SYSTOLIC BLOOD PRESSURE: 140 MMHG | HEART RATE: 97 BPM | DIASTOLIC BLOOD PRESSURE: 72 MMHG

## 2018-10-16 DIAGNOSIS — F33.2 SEVERE EPISODE OF RECURRENT MAJOR DEPRESSIVE DISORDER, WITHOUT PSYCHOTIC FEATURES (H): Primary | ICD-10-CM

## 2018-10-16 ASSESSMENT — PATIENT HEALTH QUESTIONNAIRE - PHQ9: SUM OF ALL RESPONSES TO PHQ QUESTIONS 1-9: 6

## 2018-10-16 NOTE — MR AVS SNAPSHOT
After Visit Summary   10/16/2018    Samira Romero    MRN: [id number]           Patient Information     Date Of Birth          1960        Visit Information        Provider Department      10/16/2018 1:45 PM ME [facility] PROCEDURE ROOM [facility] Psychiatry        Today's Diagnoses     Severe episode of recurrent major depressive disorder, without psychotic features (H)    -  1       Follow-ups after your visit        Your next 10 appointments already scheduled     Oct 22, 2018  1:45 PM CDT   TMS TREATMENT with ME [facility]MP PROCEDURE ROOM   [facility] ([facility])    [address]  [facility] [address]   [phone]            Oct 23, 2018  1:45 PM CDT   TMS TREATMENT with ME UMP PROCEDURE ROOM   [facility] ([facility])    [address]  [facility] [address]   [phone]            Oct 24, 2018  1:45 PM CDT   TMS TREATMENT with ME UMP PROCEDURE ROOM   [facility] ([facility])    [address]  [facility] [address]   [phone]            Oct 25, 2018  1:45 PM CDT   TMS TREATMENT with ME [facility] PROCEDURE ROOM   [facility] ([facility])    [address]  [facility] [address]   [phone]            Oct 26, 2018  1:45 PM CDT   TMS TREATMENT with ME [facility]MP PROCEDURE ROOM   [facility] ([facility])    [address]  [facility] [address]   [phone]            Oct 29, 2018  1:45 PM CDT   TMS TREATMENT with ME [facility]MP PROCEDURE ROOM   [facility] ([facility])    [address]  [facility] [address]   [phone]            Oct 30, 2018  1:45 PM CDT   TMS TREATMENT with ME [facility]MP PROCEDURE ROOM   [facility] ([facility])    [address]  [facility] [address]   [phone]            Oct 31, 2018  1:45 PM CDT   TMS TREATMENT with ME UMP PROCEDURE ROOM   [facility] ([facility])    [address]  [facility] [address] [phone]            Nov 01, 2018  1:45 PM CDT   TMS TREATMENT with ME [facility] PROCEDURE ROOM   [facility] Psychiatry ([facility])    [address]  [facility] [address] [phone]              Who to contact     Please call your clinic at [phone] to:    Ask questions about your health    Make or cancel appointments    Discuss your medicines    Learn about your test results    Speak to your doctor            Additional Information About Your Visit        [employer] Information     [employer] gives you secure access to your electronic health record. If you see a primary care provider, you can also send messages to your care team and make appointments. If you have questions, please call your primary care clinic.  If you do not have a primary care provider, please call [phone] and they will assist you.      [employer] is an electronic gateway that provides easy, online access to your medical records. With [employer], you can request a clinic appointment, read your test results, renew a prescription or communicate with your care team.     To access your existing account, please contact your [facility] or call [phone] for assistance.        Care EveryWhere ID     This is your Care EveryWhere ID. This could be used by other organizations to access your [place] medical records  [id number]        Your Vitals Were     Pulse                   97            Blood Pressure from Last 3 Encounters:   10/19/18 138/78   10/18/18 129/76   10/17/18 120/72    Weight from Last 3 Encounters:   09/12/18 75.7 kg (166 lb 12.8 oz)   07/10/18 76.8 kg (169 lb 6.4 oz)              We Performed the Following     C TRANSCRANIAL MAGNETIC STIMULATION TREATMENT,DELIVERY/MANAGEMENT        Primary Care Provider Office Phone # Fax #    [facility] [phone]  [phone]       [address]  [facility] [address]        Equal Access to Services     BIANCA KHAN : Hadii aad ku hadkendyo Socassandra, waaxda luqadaha, qaybta kaalmada adevenkatada, jaimee lloydin hayaabeth lamavalentino romero jade robertson. So [facility] [phone].    ATENCIÓN: Si habla español, tiene a cole disposición servicios gratuitos de asistencia lingüística. Rl al [phone].    We comply with applicable federal civil rights laws and [place] laws. We do not discriminate on the basis of race, color, national origin, age, disability, sex, sexual orientation, or gender identity.            Thank you!     Thank you for choosing [facility] PSYCHIATRY  for your care. Our goal is always to provide you with excellent care. Hearing back from our patients is one way we can continue to improve our services. Please take a few minutes to complete the written survey that you may receive in the mail after your visit with us. Thank you!             Your Updated Medication List - Protect others around you: Learn how to safely use, store and throw away your medicines at www.disposemymeds.org.          This list is accurate as of 10/16/18 11:59 PM.  Always use your most recent med list.                   Brand Name Dispense Instructions for use Diagnosis    ALPRAZolam 0.5 MG tablet    XANAX     CANCEL PRIOR RX -- UPDATED DOSE INSTRUCTIONS -- Take one-half to one tab by mouth at bedtime as needed for anxiety.        amphetamine-dextroamphetamine 30 MG per 24 hr capsule    ADDERALL XR     Take 30 mg by mouth        buPROPion 150 MG 24 hr tablet    WELLBUTRIN XL     TAKE 2 TABLETS EVERY       MORNING        cholecalciferol 5000 units Caps capsule    vitamin D3          Fish Oil 435 MG Caps      Take 1,200 mg by mouth        ketorolac 60 MG/2ML Soln injection    TORADOL     Inject 30-60 mg into the muscle        MULTIPLE VITAMINS-MINERALS PO           Octacosanol 1000-5 MCG-UNIT Tabs           PARoxetine 30 MG tablet    PAXIL     Take  60 mg by mouth

## 2018-10-16 NOTE — PROGRESS NOTES
[facility] TMS Program  [address]  [place] [address]  TMS Procedure Note   Samira Romero MRN# [id number]  Age: 58 year old year old YOB: 1960    Pre-Procedure:  History and Physical: Reviewed in medical record  Consent Signed by: Samira Romero  On: 09/12/18    Clinical Narrative:  Pt tolerating treatment.      Indications for TMS:  MDD, recurrent, severe; 4+ medication trials (from 2+ classes) ineffective; Psychotherapy ineffective.     Pre-Procedure Diagnosis:  MDD, recurrent, severe F33.2    Treatment Hx:  Treatment number this series: 22  Total lifetime treatment number: 22    Allergies   Allergen Reactions     Codeine Nausea and Vomiting     Penicillins Rash      /72 (BP Location: Left arm, Patient Position: Sitting, Cuff Size: Adult Regular)  Pulse 97    Pause for the Cause  Right patient:  Yes  Right procedure/correct coil:  Yes; rTMS; cpt 16779; H1 coil.   Earplugs in place:  Yes    Procedure  Patient was seated in procedure chair. Identity and procedure was verified. Ear plugs were placed in ears and patient-specific cap was placed on head and tightened appropriately. Ruler locations were verified. Coil was placed at treatment location and stimulator was set to parameters described below. A test train was delivered and pt tolerated train. Given pt tolerance, 55 treatment trains were delivered. Pt tolerated procedure with facial and right hand movement.      Motor Threshold Determination  Distance from nasion to inion: 36 cm  MT 1: 0 - 7- 14.5 @ 49% on 9/12/2018  MT 2: 0 - 7- 14.5 @ 49% on 9/27/2018  MT 3: 0 - 7- 14.5 @ 47% on 10/11/2018    Stimulation Parameters  Frequency: 18 Hz     Train duration: 2 sec  Total pulses delivered: 1980  Inter-train interval: 20 sec  Tx Loc: 0 - eyebrows  - 14.5  Energy: 56% (120% MT)  Trains: 55 trains      Post-Procedure Diagnosis:  MDD, recurrent, severe F33.2      Yanci Goss RN   [facility]  Health Neuromodulation      Plan   - Cont TMS     I did not see patient before/during treatment. I was available in the clinic throughout the treatment.      JONATHAN Snow MD  [facility] Neuromodulation

## 2018-10-17 ENCOUNTER — OFFICE VISIT (OUTPATIENT)
Dept: PSYCHIATRY | Facility: CLINIC | Age: 58
End: 2018-10-17
Payer: COMMERCIAL

## 2018-10-17 VITALS — DIASTOLIC BLOOD PRESSURE: 72 MMHG | SYSTOLIC BLOOD PRESSURE: 120 MMHG | HEART RATE: 96 BPM

## 2018-10-17 DIAGNOSIS — F33.2 SEVERE EPISODE OF RECURRENT MAJOR DEPRESSIVE DISORDER, WITHOUT PSYCHOTIC FEATURES (H): Primary | ICD-10-CM

## 2018-10-17 ASSESSMENT — PATIENT HEALTH QUESTIONNAIRE - PHQ9: SUM OF ALL RESPONSES TO PHQ QUESTIONS 1-9: 5

## 2018-10-17 NOTE — PROGRESS NOTES
[facility] TMS Program  [address]  [place] [address]  TMS Procedure Note   Samira Romero MRN# [id number]  Age: 58 year old year old YOB: 1960    Pre-Procedure:  History and Physical: Reviewed in medical record  Consent Signed by: Samira Romero  On: 09/12/18    Clinical Narrative:  Pt tolerating treatment.      Indications for TMS:  MDD, recurrent, severe; 4+ medication trials (from 2+ classes) ineffective; Psychotherapy ineffective.     Pre-Procedure Diagnosis:  MDD, recurrent, severe F33.2    Treatment Hx:  Treatment number this series: 23  Total lifetime treatment number: 23    Allergies   Allergen Reactions     Codeine Nausea and Vomiting     Penicillins Rash      /72 (BP Location: Left arm, Patient Position: Sitting, Cuff Size: Adult Regular)  Pulse 96    Pause for the Cause  Right patient:  Yes  Right procedure/correct coil:  Yes; rTMS; cpt 68300; H1 coil.   Earplugs in place:  Yes    Procedure  Patient was seated in procedure chair. Identity and procedure was verified. Ear plugs were placed in ears and patient-specific cap was placed on head and tightened appropriately. Ruler locations were verified. Coil was placed at treatment location and stimulator was set to parameters described below. A test train was delivered and pt tolerated train. Given pt tolerance, 55 treatment trains were delivered. Pt tolerated procedure with facial and right hand movement.      Motor Threshold Determination  Distance from nasion to inion: 36 cm  MT 1: 0 - 7- 14.5 @ 49% on 9/12/2018  MT 2: 0 - 7- 14.5 @ 49% on 9/27/2018  MT 3: 0 - 7- 14.5 @ 47% on 10/11/2018    Stimulation Parameters  Frequency: 18 Hz     Train duration: 2 sec  Total pulses delivered: 1980  Inter-train interval: 20 sec  Tx Loc: 0 - eyebrows  - 14.5  Energy: 56% (120% MT)  Trains: 55 trains      Post-Procedure Diagnosis:  MDD, recurrent, severe F33.2      Yanci Goss RN   [facility]  Health Neuromodulation      Plan   - Cont TMS     I did not see patient before/during treatment. I was available in the clinic throughout the treatment.      Alina Mcintyre MD  [facility] Neuromodulation

## 2018-10-17 NOTE — MR AVS SNAPSHOT
After Visit Summary   10/17/2018    Samira Romero    MRN: [id number]           Patient Information     Date Of Birth          1960        Visit Information        Provider Department      10/17/2018 1:45 PM ME [facility] PROCEDURE ROOM [facility] Psychiatry        Today's Diagnoses     Severe episode of recurrent major depressive disorder, without psychotic features (H)    -  1       Follow-ups after your visit        Your next 10 appointments already scheduled     Oct 18, 2018  1:45 PM CDT   TMS TREATMENT with ME [facility] PROCEDURE ROOM   [facility] Psych[facility] ([facility])    [address]  [facility] [address]   [phone]            Oct 19, 2018  1:45 PM CDT   TMS TREATMENT with ME [facility]MP PROCEDURE ROOM   [facility] ([facility])    [address]  [facility] [address]   [phone]            Oct 22, 2018  1:45 PM CDT   TMS TREATMENT with ME [facility] PROCEDURE ROOM   [facility] ([facility])    [address]  [facility] [address]   [phone]            Oct 23, 2018  1:45 PM CDT   TMS TREATMENT with ME [facility] PROCEDURE ROOM   [facility] ([facility])    [address]  [facility] [address]   [phone]            Oct 24, 2018  1:45 PM CDT   TMS TREATMENT with ME [facility] PROCEDURE ROOM   [facility] ([facility])    [address]  [facility] [address]   [phone]            Oct 25, 2018  1:45 PM CDT   TMS TREATMENT with ME [facility] PROCEDURE ROOM   [facility] ([facility])    [address]  [facility] [address]   [phone]            Oct 26, 2018  1:45 PM CDT   TMS TREATMENT with ME [facility] PROCEDURE ROOM   [facility] ([facility])    [address]  [facility] [address]   [phone]            Oct 29, 2018  1:45 PM CDT   TMS TREATMENT with ME UMP PROCEDURE ROOM   [facility]  Psychiatry ([facility])    [address]  [facility] [address]   [phone]            Oct 30, 2018  1:45 PM CDT   TMS TREATMENT with ME UMP PROCEDURE ROOM   [facility] Psychiatry ([facility])    [address]  [facility] [address] [phone]            Oct 31, 2018  1:45 PM CDT   TMS TREATMENT with ME UMP PROCEDURE ROOM   [facility] Psychiatry ([facility])    [address]  [facility] [address] [phone]              Who to contact     Please call your clinic at [phone] to:    Ask questions about your health    Make or cancel appointments    Discuss your medicines    Learn about your test results    Speak to your doctor            Additional Information About Your Visit        [employer] Information     [employer] gives you secure access to your electronic health record. If you see a primary care provider, you can also send messages to your care team and make appointments. If you have questions, please call your primary care clinic.  If you do not have a primary care provider, please call [phone] and they will assist you.      [employer] is an electronic gateway that provides easy, online access to your medical records. With [employer], you can request a clinic appointment, read your test results, renew a prescription or communicate with your care team.     To access your existing account, please contact your [facility] or call [phone] for assistance.        Care EveryWhere ID     This is your Care EveryWhere ID. This could be used by other organizations to access your [place] medical records  [id number]        Your Vitals Were     Pulse                   96            Blood Pressure from Last 3 Encounters:   10/17/18 120/72   10/16/18 140/72   10/15/18 138/84    Weight from Last 3 Encounters:   09/12/18 75.7 kg (166 lb 12.8 oz)   07/10/18 76.8 kg (169 lb 6.4 oz)              We  Performed the Following     C TRANSCRANIAL MAGNETIC STIMULATION TREATMENT,DELIVERY/MANAGEMENT        Primary Care Provider Office Phone # Fax #    [facility] [phone] [address]  [facility] [address]        Equal Access to Services     BIANCA KHAN : Hadii aad ku hadkendyo Soomaali, waaxda luqadaha, qaybta kaalmada adeegyada, waxay lloydin haybilln priscila ruiztikitrey robertson. So [facility] [phone].    ATENCIÓN: Si habla español, tiene a cole disposición servicios gratuitos de asistencia lingüística. Llame al [phone].    We comply with applicable federal civil rights laws and [place] laws. We do not discriminate on the basis of race, color, national origin, age, disability, sex, sexual orientation, or gender identity.            Thank you!     Thank you for choosing [facility] PSYCHIATRY  for your care. Our goal is always to provide you with excellent care. Hearing back from our patients is one way we can continue to improve our services. Please take a few minutes to complete the written survey that you may receive in the mail after your visit with us. Thank you!             Your Updated Medication List - Protect others around you: Learn how to safely use, store and throw away your medicines at www.disposemymeds.org.          This list is accurate as of 10/17/18  4:18 PM.  Always use your most recent med list.                   Brand Name Dispense Instructions for use Diagnosis    ALPRAZolam 0.5 MG tablet    XANAX     CANCEL PRIOR RX -- UPDATED DOSE INSTRUCTIONS -- Take one-half to one tab by mouth at bedtime as needed for anxiety.        amphetamine-dextroamphetamine 30 MG per 24 hr capsule    ADDERALL XR     Take 30 mg by mouth        buPROPion 150 MG 24 hr tablet    WELLBUTRIN XL     TAKE 2 TABLETS EVERY       MORNING        cholecalciferol 5000 units Caps capsule    vitamin D3          Fish Oil 435 MG Caps      Take 1,200 mg by mouth        ketorolac 60 MG/2ML  Soln injection    TORADOL     Inject 30-60 mg into the muscle        MULTIPLE VITAMINS-MINERALS PO           Octacosanol 1000-5 MCG-UNIT Tabs           PARoxetine 30 MG tablet    PAXIL     Take 60 mg by mouth

## 2018-10-18 ENCOUNTER — OFFICE VISIT (OUTPATIENT)
Dept: PSYCHIATRY | Facility: CLINIC | Age: 58
End: 2018-10-18
Payer: COMMERCIAL

## 2018-10-18 VITALS — SYSTOLIC BLOOD PRESSURE: 129 MMHG | DIASTOLIC BLOOD PRESSURE: 76 MMHG | HEART RATE: 95 BPM

## 2018-10-18 DIAGNOSIS — F33.2 SEVERE EPISODE OF RECURRENT MAJOR DEPRESSIVE DISORDER, WITHOUT PSYCHOTIC FEATURES (H): Primary | ICD-10-CM

## 2018-10-18 ASSESSMENT — PATIENT HEALTH QUESTIONNAIRE - PHQ9: SUM OF ALL RESPONSES TO PHQ QUESTIONS 1-9: 3

## 2018-10-18 NOTE — MR AVS SNAPSHOT
After Visit Summary   10/18/2018    Samira Romero    MRN: [id number]           Patient Information     Date Of Birth          1960        Visit Information        Provider Department      10/18/2018 1:45 PM ME UMP PROCEDURE ROOM [facility]        Today's Diagnoses     Severe episode of recurrent major depressive disorder, without psychotic features (H)    -  1       Follow-ups after your visit        Your next 10 appointments already scheduled     Oct 19, 2018  2:30 PM CDT   Adult Med Follow UP with Cristine Snow MD   [facility] ([facility])    [address]  [facility] [address]   [phone]            Oct 22, 2018  1:45 PM CDT   TMS TREATMENT with ME UMP PROCEDURE ROOM   [facility] ([facility])    [address]  [facility] [address]   [phone]            Oct 23, 2018  1:45 PM CDT   TMS TREATMENT with ME UMP PROCEDURE ROOM   [facility] ([facility])    [address]  [facility] [address]   [phone]            Oct 24, 2018  1:45 PM CDT   TMS TREATMENT with ME UMP PROCEDURE ROOM   [facility] ([facility])    [address]  [facility] [address]   [phone]            Oct 25, 2018  1:45 PM CDT   TMS TREATMENT with ME UMP PROCEDURE ROOM   [facility] ([facility])    [address]  [facility] [address]   [phone]            Oct 26, 2018  1:45 PM CDT   TMS TREATMENT with ME UMP PROCEDURE ROOM   [facility] ([facility])    [address]  [facility] [address]   [phone]            Oct 29, 2018  1:45 PM CDT   TMS TREATMENT with ME UMP PROCEDURE ROOM   [facility] ([facility])    [address]  [facility] [address]   [phone]            Oct 30, 2018  1:45 PM CDT   TMS TREATMENT with ME P PROCEDURE ROOM    [facility] ([facility])    [address]  [facility] [address]   [phone]            Oct 31, 2018  1:45 PM CDT   TMS TREATMENT with ME UMP PROCEDURE ROOM   [facility] ([facility])    [address]  [facility] [address]   [phone]            Nov 01, 2018  1:45 PM CDT   TMS TREATMENT with ME UMP PROCEDURE ROOM   [facility])    [address]  [facility] [address] [phone]              Who to contact     Please call your clinic at [phone] to:    Ask questions about your health    Make or cancel appointments    Discuss your medicines    Learn about your test results    Speak to your doctor            Additional Information About Your Visit        [employer] Information     [employer] gives you secure access to your electronic health record. If you see a primary care provider, you can also send messages to your care team and make appointments. If you have questions, please call your primary care clinic.  If you do not have a primary care provider, please call [phone] and they will assist you.      [employer] is an electronic gateway that provides easy, online access to your medical records. With [employer], you can request a clinic appointment, read your test results, renew a prescription or communicate with your care team.     To access your existing account, please contact your [facility] or call [phone] for assistance.        Care EveryWhere ID     This is your Care EveryWhere ID. This could be used by other organizations to access your [facility] medical records  [id number]        Your Vitals Were     Pulse                   95            Blood Pressure from Last 3 Encounters:   10/19/18 138/78   10/18/18 129/76   10/17/18 120/72    Weight from Last 3 Encounters:   09/12/18 75.7 kg (166 lb 12.8 oz)   07/10/18 76.8 kg (169 lb 6.4 oz)               We Performed the Following     C REPET TMS TX SUBSEQ MOTR THRESHLD W/DELIV & MNGT        Primary Care Provider Office Phone # Fax #    [facility] [phone] [address] [facility] [address]        Equal Access to Services     BIANCA KHAN : Hadii aad ku hadasho Soomaali, waaxda luqadaha, qaybta kaalmada adeegyada, waxay idiin hayaan adeeg khtikish la'aan . So [facility] [phone].    ATENCIÓN: Si habla español, tiene a cole disposición servicios gratuitos de asistencia lingüística. [facility] [phone].    We comply with applicable federal civil rights laws and [place] laws. We do not discriminate on the basis of race, color, national origin, age, disability, sex, sexual orientation, or gender identity.            Thank you!     Thank you for choosing [facility]  for your care. Our goal is always to provide you with excellent care. Hearing back from our patients is one way we can continue to improve our services. Please take a few minutes to complete the written survey that you may receive in the mail after your visit with us. Thank you!             Your Updated Medication List - Protect others around you: Learn how to safely use, store and throw away your medicines at www.disposemymeds.org.          This list is accurate as of 10/18/18 11:59 PM.  Always use your most recent med list.                   Brand Name Dispense Instructions for use Diagnosis    ALPRAZolam 0.5 MG tablet    XANAX     CANCEL PRIOR RX -- UPDATED DOSE INSTRUCTIONS -- Take one-half to one tab by mouth at bedtime as needed for anxiety.        amphetamine-dextroamphetamine 30 MG per 24 hr capsule    ADDERALL XR     Take 30 mg by mouth        buPROPion 150 MG 24 hr tablet    WELLBUTRIN XL     TAKE 2 TABLETS EVERY       MORNING        cholecalciferol 5000 units Caps capsule    vitamin D3          Fish Oil 435 MG Caps      Take 1,200 mg by mouth        ketorolac 60 MG/2ML Soln  injection    TORADOL     Inject 30-60 mg into the muscle        MULTIPLE VITAMINS-MINERALS PO           Octacosanol 1000-5 MCG-UNIT Tabs           PARoxetine 30 MG tablet    PAXIL     Take 60 mg by mouth

## 2018-10-18 NOTE — PROGRESS NOTES
[facility] TMS Program  [address]  [place] [address]  TMS Procedure Note   Samira Romero MRN# [id number]  Age: 58 year old year old YOB: 1960    Pre-Procedure:  History and Physical: Reviewed in medical record  Consent Signed by: Samira Romero  On: 09/12/18    Clinical Narrative:  Pt tolerating treatment.      Indications for TMS:  MDD, recurrent, severe; 4+ medication trials (from 2+ classes) ineffective; Psychotherapy ineffective.     Pre-Procedure Diagnosis:  MDD, recurrent, severe F33.2    Treatment Hx:  Treatment number this series: 24  Total lifetime treatment number: 24    Allergies   Allergen Reactions     Codeine Nausea and Vomiting     Penicillins Rash      /76 (BP Location: Left arm, Patient Position: Sitting, Cuff Size: Adult Regular)  Pulse 95    Pause for the Cause  Right patient:  Yes  Right procedure/correct coil:  Yes; rTMS; cpt 33509; H1 coil.   Earplugs in place:  Yes    Procedure  Patient was seated in procedure chair. Identity and procedure was verified. Ear plugs were placed in ears and patient-specific cap was placed on head and tightened appropriately. Ruler locations were verified. Coil was placed at initial MT location and stimulator was set to initial MT. MT was retested and found as described below. Coil was placed at treatment location and stimulator was set to stimulation parameters detailed below. A test train was delivered and pt tolerated train fine. Given pt tolerance, 55 trains were delivered. Pt tolerated procedure with facial and right hand movement.      Motor Threshold Determination  Distance from nasion to inion: 36 cm  MT 1: 0 - 7- 14.5 @ 49% on 9/12/2018  MT 2: 0 - 7- 14.5 @ 49% on 9/27/2018  MT 3: 0 - 7- 14.5 @ 47% on 10/11/2018  MT 4: 0 - 7- 14.5 @ 46% on 10/11/2018    Stimulation Parameters  Frequency: 18 Hz     Train duration: 2 sec  Total pulses delivered: 1980  Inter-train interval: 20 sec  Tx Loc: 0 - eyebrows  -  14.5  Energy: 55% (120% MT)  Trains: 55 trains      Post-Procedure Diagnosis:  MDD, recurrent, severe F33.2      Yanci Goss RN   [facility]      Plan   - Cont TMS    I completed repeat determination of the pt's motor threshold during the visit today. I was available in the clinic throughout the treatment.    Cristine Snow MD  [facility]

## 2018-10-19 ENCOUNTER — OFFICE VISIT (OUTPATIENT)
Dept: PSYCHIATRY | Facility: CLINIC | Age: 58
End: 2018-10-19
Payer: COMMERCIAL

## 2018-10-19 VITALS — HEART RATE: 94 BPM | SYSTOLIC BLOOD PRESSURE: 138 MMHG | DIASTOLIC BLOOD PRESSURE: 78 MMHG

## 2018-10-19 DIAGNOSIS — F33.2 SEVERE EPISODE OF RECURRENT MAJOR DEPRESSIVE DISORDER, WITHOUT PSYCHOTIC FEATURES (H): Primary | ICD-10-CM

## 2018-10-19 ASSESSMENT — PATIENT HEALTH QUESTIONNAIRE - PHQ9: SUM OF ALL RESPONSES TO PHQ QUESTIONS 1-9: 5

## 2018-10-19 NOTE — MR AVS SNAPSHOT
After Visit Summary   10/19/2018    Samira Romero    MRN: [id number]           Patient Information     Date Of Birth          1960        Visit Information        Provider Department      10/19/2018 1:45 PM ME UMP PROCEDURE ROOM [facility]        Today's Diagnoses     Severe episode of recurrent major depressive disorder, without psychotic features (H)    -  1       Follow-ups after your visit        Your next 10 appointments already scheduled     Oct 22, 2018  1:45 PM CDT   TMS TREATMENT with ME UMP PROCEDURE ROOM   [facility] ([facility])    [address]  [facility] [address]   [phone]            Oct 23, 2018  1:45 PM CDT   TMS TREATMENT with ME UMP PROCEDURE ROOM   [facility] ([facility])    [address]  [facility] [address]   [phone]            Oct 24, 2018  1:45 PM CDT   TMS TREATMENT with ME UMP PROCEDURE ROOM   [facility] ([facility])    [address]  [facility] [address]   [phone]            Oct 25, 2018  1:45 PM CDT   TMS TREATMENT with ME UMP PROCEDURE ROOM   [facility] ([facility])    [address]  [facility] [address]   [phone]            Oct 26, 2018  1:45 PM CDT   TMS TREATMENT with ME UMP PROCEDURE ROOM   [facility] ([facility])    [address]  [facility] [address]   [phone]            Oct 29, 2018  1:45 PM CDT   TMS TREATMENT with ME UMP PROCEDURE ROOM   [facility] ([facility])    [address]  [facility] [address]   [phone]            Oct 30, 2018  1:45 PM CDT   TMS TREATMENT with ME UMP PROCEDURE ROOM   [facility] ([facility])    [address]  [facility] [address]   [phone]            Oct 31, 2018  1:45 PM CDT   TMS TREATMENT with ME UMP PROCEDURE ROOM   [facility] ([facility])    [address]  [facility] [address] [phone]            Nov 01, 2018  1:45 PM CDT   TMS TREATMENT with ME [facility] PROCEDURE ROOM   [facility] ([facility])    [address]  [facility] [address] [phone]              Who to contact     Please call your clinic at [phone] to:    Ask questions about your health    Make or cancel appointments    Discuss your medicines    Learn about your test results    Speak to your doctor            Additional Information About Your Visit        [employer] Information     [employer] gives you secure access to your electronic health record. If you see a primary care provider, you can also send messages to your care team and make appointments. If you have questions, please call your primary care clinic.  If you do not have a primary care provider, please call [phone] and they will assist you.      [employer] is an electronic gateway that provides easy, online access to your medical records. With [employer], you can request a clinic appointment, read your test results, renew a prescription or communicate with your care team.     To access your existing account, please contact your [facility] or call [phone] for assistance.        Care EveryWhere ID     This is your Care EveryWhere ID. This could be used by other organizations to access your [place] medical records  [id number]        Your Vitals Were     Pulse                   94            Blood Pressure from Last 3 Encounters:   10/19/18 138/78   10/18/18 129/76   10/17/18 120/72    Weight from Last 3 Encounters:   09/12/18 75.7 kg (166 lb 12.8 oz)   07/10/18 76.8 kg (169 lb 6.4 oz)              We Performed the Following     C TRANSCRANIAL MAGNETIC STIMULATION TREATMENT,DELIVERY/MANAGEMENT        Primary Care Provider Office Phone # Fax #    [facility] [phone]  [phone]       [address]  [facility] [address]        Equal Access to Services     BIANCA KHAN : Hadii aad ku hadkendyo Socassandra, waaxda luqadaha, qaybta kaalmada adevenkatada, jaimee lloydin hayaabeth lamavalentino romero jade robertson. So [facility] [phone].    ATENCIÓN: Si habla español, tiene a cole disposición servicios gratuitos de asistencia lingüística. Rl al [phone].    We comply with applicable federal civil rights laws and [place] laws. We do not discriminate on the basis of race, color, national origin, age, disability, sex, sexual orientation, or gender identity.            Thank you!     Thank you for choosing [facility] PSY[facility]IAT[facility]  for your care. Our goal is always to provide you with excellent care. Hearing back from our patients is one way we can continue to improve our services. Please take a few minutes to complete the written survey that you may receive in the mail after your visit with us. Thank you!             Your Updated Medication List - Protect others around you: Learn how to safely use, store and throw away your medicines at www.disposemymeds.org.          This list is accurate as of 10/19/18  4:27 PM.  Always use your most recent med list.                   Brand Name Dispense Instructions for use Diagnosis    ALPRAZolam 0.5 MG tablet    XANAX     CANCEL PRIOR RX -- UPDATED DOSE INSTRUCTIONS -- Take one-half to one tab by mouth at bedtime as needed for anxiety.        amphetamine-dextroamphetamine 30 MG per 24 hr capsule    ADDERALL XR     Take 30 mg by mouth        buPROPion 150 MG 24 hr tablet    WELLBUTRIN XL     TAKE 2 TABLETS EVERY       MORNING        cholecalciferol 5000 units Caps capsule    vitamin D3          Fish Oil 435 MG Caps      Take 1,200 mg by mouth        ketorolac 60 MG/2ML Soln injection    TORADOL     Inject 30-60 mg into the muscle        MULTIPLE VITAMINS-MINERALS PO           Octacosanol 1000-5 MCG-UNIT Tabs           PARoxetine 30 MG tablet    PAXIL     Take  60 mg by mouth

## 2018-10-19 NOTE — MR AVS SNAPSHOT
After Visit Summary   10/19/2018    Samira Romero    MRN: [id number]           Patient Information     Date Of Birth          1960        Visit Information        Provider Department      10/19/2018 2:30 PM Cristine Snow MD [facility] Psychiat[facility]        Care Instructions    NAC instructions    600mg twice daily x 2 weeks  then  1200mg twice daily x 2 weeks  then   1800mg twice daily and continue on this dose    If your skin picking or trichotillomania symptoms stop at lower dose than 1800 mg twice daily, just continue at the dose that was effective.    Use capsules NOT liquid  Do NOT use if have Asthma  Adverse effects could include:  Nausea, indigestion, abdominal pain and headache  Best to take it with multivitamin and vit C      Low-cost NAC can be ordered from [employer]:  [email or web address]              Follow-ups after your visit        Your next 10 appointments already scheduled     Oct 22, 2018  1:45 PM CDT   TMS TREATMENT with ME [facility] PROCEDURE ROOM   [facility]iat[facility] ([facility])    [address]  [facility] [address]   [phone]            Oct 23, 2018  1:45 PM CDT   TMS TREATMENT with ME [facility] PROCEDURE ROOM   [facility] Psychiat[facility] ([facility])    [address]  [facility] [address]   [phone]            Oct 24, 2018  1:45 PM CDT   TMS TREATMENT with [facility] PROCEDURE ROOM   [facility]iat[facility] ([facility])    [address]  [facility] [address]   [phone]            Oct 25, 2018  1:45 PM CDT   TMS TREATMENT with ME [facility] PROCEDURE ROOM   [facility] ([facility])    [address]  [facility] [address]   [phone]            Oct 26, 2018  1:45 PM CDT   TMS TREATMENT with ME [facility] PROCEDURE ROOM   [facility]iat[facility] ([facility])    [address]  [facility] [address]    [phone]            Oct 29, 2018  1:45 PM CDT   TMS TREATMENT with ME [facility]MP PROCEDURE ROOM   [facility] ([facility])    [address]  [facility] [address] [phone]            Oct 30, 2018  1:45 PM CDT   TMS TREATMENT with ME UMP PROCEDURE ROOM   [facility] ([facility])    [address]  [facility] [address] [phone]            Oct 31, 2018  1:45 PM CDT   TMS TREATMENT with ME UMP PROCEDURE ROOM   [facility] ([facility])    [address]  [facility] [address] [phone]            Nov 01, 2018  1:45 PM CDT   TMS TREATMENT with ME [facility]MP PROCEDURE ROOM   [facility] ([facility])    [address]  [facility] [address] [phone]              Who to contact     Please call your clinic at [phone] to:    Ask questions about your health    Make or cancel appointments    Discuss your medicines    Learn about your test results    Speak to your doctor            Additional Information About Your Visit        [employer]aya Information     [employer] gives you secure access to your electronic health record. If you see a primary care provider, you can also send messages to your care team and make appointments. If you have questions, please call your primary care clinic.  If you do not have a primary care provider, please call [phone] and they will assist you.      [employer] is an electronic gateway that provides easy, online access to your medical records. With [employer], you can request a clinic appointment, read your test results, renew a prescription or communicate with your care team.     To access your existing account, please contact your [facility] or call [phone] for assistance.        Care EveryWhere ID     This is your Care EveryWhere ID. This could be used by other organizations to access your [facility]  records  [id number]         Blood Pressure from Last 3 Encounters:   10/19/18 138/78   10/18/18 129/76   10/17/18 120/72    Weight from Last 3 Encounters:   09/12/18 166 lb 12.8 oz (75.7 kg)   07/10/18 169 lb 6.4 oz (76.8 kg)              Today, you had the following     No orders found for display       Primary Care Provider Office Phone # Fax #    [facility] [phone] [address] [facility] [address]        Equal Access to Services     [facility] : Hadii aad ku hadasho Soomaali, waaxda luqadaha, qaybta kaalmada adeegyada, waxthor idiin hayaan adeeg nick hansen . So [facility] [phone].    ATENCIÓN: Si habla español, tiene a cole disposición servicios gratuitos de asistencia lingüística. Llame al [phone].    We comply with applicable federal civil rights laws and [place] laws. We do not discriminate on the basis of race, color, national origin, age, disability, sex, sexual orientation, or gender identity.            Thank you!     Thank you for choosing [facility] PSYCHIATRY  for your care. Our goal is always to provide you with excellent care. Hearing back from our patients is one way we can continue to improve our services. Please take a few minutes to complete the written survey that you may receive in the mail after your visit with us. Thank you!             Your Updated Medication List - Protect others around you: Learn how to safely use, store and throw away your medicines at www.disposemymeds.org.          This list is accurate as of 10/19/18  3:02 PM.  Always use your most recent med list.                   Brand Name Dispense Instructions for use Diagnosis    ALPRAZolam 0.5 MG tablet    XANAX     CANCEL PRIOR RX -- UPDATED DOSE INSTRUCTIONS -- Take one-half to one tab by mouth at bedtime as needed for anxiety.        amphetamine-dextroamphetamine 30 MG per 24 hr capsule    ADDERALL XR     Take 30 mg by mouth        buPROPion 150 MG 24 hr  tablet    WELLBUTRIN XL     TAKE 2 TABLETS EVERY       MORNING        cholecalciferol 5000 units Caps capsule    vitamin D3          Fish Oil 435 MG Caps      Take 1,200 mg by mouth        ketorolac 60 MG/2ML Soln injection    TORADOL     Inject 30-60 mg into the muscle        MULTIPLE VITAMINS-MINERALS PO           Octacosanol 1000-5 MCG-UNIT Tabs           PARoxetine 30 MG tablet    PAXIL     Take 60 mg by mouth

## 2018-10-19 NOTE — PROGRESS NOTES
"  Psychiatry Clinic Progress Note                                                                   Samira Romero is a 57 year old female who prefers the name \"Samira\" and pronoun she.  Therapist: None  PCP: Michael, [facility] [place]  Other Providers: Psychiatrist is Dr. Kristine Munoz at [facility]  Referred by Dr. Munoz through [facility] for evaluation of depression.     History was provided by patient who was a good historian.    Pertinent Background:  See previous notes.  Psych critical item history includes mutiple psychotropic trials and passive suicidal ideation.    Interim History                                                                                                        4, 4     The patient is a good historian, reports good treatment adherence and was last seen 10/18/2018 during repeat MT procedure for acute course of rTMS.  Since the last visit    She reports that stimulant has been helpful for depression but questions whether the stimulant is possibly increasing her anxiety in conjunction with TMS.    She also states taht she has struggled with skin picking/trichotillomania in college.    Describes spending hours picking hairs off of her legs. Previously had picked eyebrows but was having frequent scabbing on her face, so switched to legs where it could be more easily     She reports that she feels that the Adderall continues to help with fatigue.    Feels that her energy is better with TMS.    Describes a long history of compulsive quasi-addictive behaviors that she associates with skin picking and trichotillomania.    Feels that ADHD is better with a stimulant but thinks this may be do to absence of lethargy.    She reports that she is not currently in psychotherapy. She does have a person that she does \"body work\" (Pelon Veliz) with. States that she has found this to be very helpful, more helpful that psychotherapy. Feels like sometimes talk therapy caused her to feel " "\"more revved up.\"    Discussed initiation of a course of NAC for management of trichotillomania symptoms. Discussed risks and side effects associated with this supplement. Also provided directions for buying over the counter as well as taking with vit C to increase absorption. She agreed potential risks/side effects were outweighed by potential benefit. Should this be ineffective also informed pt that we could try a course of CBT for trichotillomania which this provider has previously had success with.    Recent Symptoms:   Depression: Positive for depressive symptoms including sadness, irritability, anhedonia, increased appetite, hypersomnia, fatigue, feeling worthless, inappropriate guilt, indecisiveness, and passive thoughts of death.   Anxiety: Describes significant worry over things that she does not have control over.      Recent Substance Use:  none reported    Social/ Family History                                  [per patient report]                                 1ea,1ea   FINANCIAL SUPPORT- unemployed, supported by       CHILDREN- 1 daughter (29yo)       LIVING SITUATION- Lives with  in a house in [facility]      LEGAL- None  EARLY HISTORY/ EDUCATION- Graduated from college at [employer]. Went to graduate school at [employer]  SOCIAL/ SPIRITUAL SUPPORT- No spiritual community.         TRAUMA HISTORY (self-report)- See HPI  FEELS SAFE AT HOME- Yes     Family history: maternal grandmother was bipolar    Medical / Surgical History                                                                                                                There is no problem list on file for this patient.      No past surgical history on file.     Medical Review of Systems                                                                                                    2,10   GENERAL: Negative for malaise, significant weight loss and fever  HEENT: No changes in hearing or vision, no nose bleeds or " other nasal problems and Negative for frequent or significant headaches  NECK: Negative for lumps, goiter, pain and significant neck swelling  RESPIRATORY: Negative for cough, wheezing and shortness of breath  CARDIOVASCULAR: Negative for chest pain, leg swelling and palpitations  GI: Negative for abdominal discomfort, blood in stools or black stools and change in bowel habits  : Negative for dysuria, frequency and incontinence  MUSCULOSKELETAL: Negative for joint pain or swelling, back pain, and muscle pain.  SKIN: Negative for lesions, rash, and itching.  PSYCH: See HPI  HEMATOLOGY/LYMPHOLOGY Negative for prolonged bleeding, bruising easily, and swollen nodes.  ENDOCRINE: Negative for cold or heat intolerance, polyuria, polydipsia and goiter.  NEURO:  migraine headaches      Allergy                                Codeine and Penicillins  Current Medications                                                                                                       Current Outpatient Prescriptions   Medication Sig Dispense Refill     ALPRAZolam (XANAX) 0.5 MG tablet CANCEL PRIOR RX -- UPDATED DOSE INSTRUCTIONS -- Take one-half to one tab by mouth at bedtime as needed for anxiety.       amphetamine-dextroamphetamine (ADDERALL XR) 30 MG per 24 hr capsule Take 30 mg by mouth       buPROPion (WELLBUTRIN XL) 150 MG 24 hr tablet TAKE 2 TABLETS EVERY       MORNING       cholecalciferol (VITAMIN D3) 5000 units CAPS capsule        ketorolac (TORADOL) 60 MG/2ML SOLN injection Inject 30-60 mg into the muscle       Misc Natural Products (OCTACOSANOL) 1000-5 MCG-UNIT TABS        MULTIPLE VITAMINS-MINERALS PO        Omega-3 Fatty Acids (FISH OIL) 435 MG CAPS Take 1,200 mg by mouth       PARoxetine (PAXIL) 30 MG tablet Take 60 mg by mouth       Vitals                                                                                                                       3, 3   There were no vitals taken for this visit.   Mental Status  Exam                                                                                    9, 14 cog gs     Alertness: alert  and oriented  Appearance: well groomed  Behavior/Demeanor: cooperative, pleasant and calm, with good  eye contact   Speech: normal and regular rate and rhythm  Language: intact and no problems  Psychomotor: sits forward or near front of chair  Mood: depressed and anxious  Affect: restricted; was congruent to mood; was congruent to content  Thought Process/Associations: unremarkable  Thought Content:  Reports none;  Denies suicidal and violent ideation  Perception:  Reports none;  Denies auditory hallucinations and visual hallucinations  Insight: good  Judgment: good  Cognition: (6) oriented: time, person, and place  attention span: intact  concentration: intact  recent memory: intact  remote memory: intact  fund of knowledge: appropriate  Gait/Station and/or Muscle Strength/Tone: unremarkable    Labs and Data                                                                                                                 Rating Scales:    PHQ9    PHQ9 Today:  4  PHQ-9 SCORE 10/17/2018 10/18/2018 10/19/2018   Total Score 3 5 4         Diagnosis and Assessment                                                                             m2, h3     Samira Romero is a female with previous psychiatric history of MDD, recurrent, severe who presents for evaluation of candidacy for Transcranial Magnetic Stimulation for treatment resistant depression. She has a well documented failure of adequate trials of >= 4 antidepressants which represent multiple antidepressant classes as well as augmentation therapies. The patient has completed an adequate dose of individual psychotherapy. Due to remaining profound depression and numerous failed previous treatment modalities the patient is a candidate for TMS treatment.     The risks, benefits, alternatives and potential adverse effects have been explained and are  understood by the patient. Samira Romero agrees to the treatment plan with the ability to do so. The pt knows to call the clinic for any problems or access emergency care if needed. There are medical considerations relevant to treatment, as noted above. Substance use is not a problem as noted above.       The patient understands that treatment consists of up to 37 treatment sessions. The patient cannot miss more than two sessions during treatment course, or course will be invalidated. The patient may not drink any alcohol or use any illicit drugs during treatment. The patient may not make any medication changes during the course of treatment. After treatment is complete, the patient will transfer back to the referring provider.     Suicide Risk Assessment:  Today Samira Romero reports passive suicidal ideation without plan or intent. In addition, she has notable risk factors for self-harm, including family history of suicide. However, risk is mitigated by no h/o suicide attempt, no plan or intent, describes a safety plan, h/o seeking help when needed, future oriented, none to minimal alcohol use , commitment to family, good social support   and stable housing. Therefore, based on all available evidence including the factors cited above, she does not appear to be at imminent risk for self-harm, does not meet criteria for a 72-hr hold, and therefore involuntary hospitalization will not be pursued at this  time. However, based on degree of symptoms, voluntary referral for outpatient rTMS was recommended, she accepted this offer.    Today the following issues were addressed:    1) Major depressive disorder, recurrent, severe without psychotic features  2) Trichotillomania    [place] Prescription Monitoring Program [] review was not needed today.    PSYCHOTROPIC DRUG INTERACTIONS: none clinically relevant    Plan                                                                                                                      m2, h3     1) MDD, recurrent, severe  -- Medications:    - Continue current psychotropic medication regimen   - Start NAC titration starting with 600 mg BID x 1 week then increase by 600 mg BID q week to max dose of 1800 mg BID for management of skin picking  -- Psychotherapy: Continue individual outpatient psychotherapy  -- Procedures:   - Pt is approved for an acute course of repetitive transcranial magnetic stimulation   - Coil: F8   -- Referrals: none    1) Trichotillomania  -- Medications:    - Start NAC titration starting with 600 mg BID x 1 week then increase by 600 mg BID q week to max dose of 1800 mg BID for management of skin picking  -- Psychotherapy:   - Should NAC prove ineffective, will recommend initiation of an acute course of CBT for trichotillomania    RTC: 2 weeks    CRISIS NUMBERS:   Provided routinely in AVS.    Treatment Risk Statement:  The patient understands the risks, benefits, adverse effects and alternatives. Agrees to treatment with the capacity to do so. No medical contraindications to treatment. Agrees to call clinic for any problems. The patient understands to call 911 or go to the nearest ED if life threatening or urgent symptoms occur.      Psychiatry Clinic Individual Psychotherapy Note                                                                     [16]     Start time: 2:20pm        End time: 3:00pm  Date last reviewed: 10/19/2018       Date next due: 1/19/2019     Subjective: This supportive psychotherapy session addressed issues related to orientation to therapy, goals of therapy, and current stressors consisting of current mood symptoms, relationship  and work .  Patient's reaction: Preparatory in the context of mental status appropriate for ambulatory setting.  Progress: fair to good  Plan: RTC 2 weeks  Psychotherapy services during this visit included myself and the patient.   Treatment Plan      SYMPTOMS; PROBLEMS   MEASURABLE GOALS;    FUNCTIONAL IMPROVEMENT  INTERVENTIONS;   GAINS MADE DISCHARGE CRITERIA   Depression: anhedonia   find enjoyment at least once a day self-care skills  strength focus marked symptom improvement   Depression: depressed mood and feeling hopelesss   develop strategies for thought distraction when ruminating and reduce feeling overwhelmed/ improve decision making skills increase coping skills  strength focus  stress management marked symptom improvement     PROVIDER:  Cristine Snow MD

## 2018-10-19 NOTE — PATIENT INSTRUCTIONS
NAC instructions    600mg twice daily x 2 weeks  then  1200mg twice daily x 2 weeks  then   1800mg twice daily and continue on this dose    If your skin picking or trichotillomania symptoms stop at lower dose than 1800 mg twice daily, just continue at the dose that was effective.    Use capsules NOT liquid  Do NOT use if have Asthma  Adverse effects could include:  Nausea, indigestion, abdominal pain and headache  Best to take it with multivitamin and vit C      Low-cost NAC can be ordered from [employer]:  [email or web address]

## 2018-10-19 NOTE — PROGRESS NOTES
[facility] TMS Program  [address], [address]  [place] [address]  TMS Procedure Note   Samira Romero MRN# [id number]  Age: 58 year old year old YOB: 1960    Pre-Procedure:  History and Physical: Reviewed in medical record  Consent Signed by: Samira Romero  On: 09/12/18    Clinical Narrative:  Pt tolerating treatment.      Indications for TMS:  MDD, recurrent, severe; 4+ medication trials (from 2+ classes) ineffective; Psychotherapy ineffective.     Pre-Procedure Diagnosis:  MDD, recurrent, severe F33.2    Treatment Hx:  Treatment number this series: 25  Total lifetime treatment number: 25    Allergies   Allergen Reactions     Codeine Nausea and Vomiting     Penicillins Rash      /78 (BP Location: Left arm, Patient Position: Sitting, Cuff Size: Adult Regular)  Pulse 94    Pause for the Cause  Right patient:  Yes  Right procedure/correct coil:  Yes; rTMS; cpt 72323; H1 coil.   Earplugs in place:  Yes    Procedure  Patient was seated in procedure chair. Identity and procedure was verified. Ear plugs were placed in ears and patient-specific cap was placed on head and tightened appropriately. Ruler locations were verified. Coil was placed at treatment location and stimulator was set to parameters described below. A test train was delivered and pt tolerated train. Given pt tolerance, 55 treatment trains were delivered. Pt tolerated procedure with some facial and right hand movement.    Motor Threshold Determination  Distance from nasion to inion: 36 cm  MT 1: 0 - 7- 14.5 @ 49% on 9/12/2018  MT 2: 0 - 7- 14.5 @ 49% on 9/27/2018  MT 3: 0 - 7- 14.5 @ 47% on 10/11/2018  MT 4: 0 - 7- 14.5 @ 46% on 10/18/2018    Stimulation Parameters  Frequency: 18 Hz     Train duration: 2 sec  Total pulses delivered: 1980  Inter-train interval: 20 sec  Tx Loc: 0 - eyebrows  - 14.5  Energy: 55% (120% MT)  Trains: 55 trains      Post-Procedure Diagnosis:  MDD, recurrent, severe F33.2      Yanci Goss  RN   [facility]      Plan   - Cont TMS     I saw the patient after treatment and remained available in the clinic during  treatment.    Cristine Snow MD  [facility]

## 2018-10-20 ASSESSMENT — PATIENT HEALTH QUESTIONNAIRE - PHQ9: SUM OF ALL RESPONSES TO PHQ QUESTIONS 1-9: 4

## 2018-10-22 ENCOUNTER — OFFICE VISIT (OUTPATIENT)
Dept: PSYCHIATRY | Facility: CLINIC | Age: 58
End: 2018-10-22
Payer: COMMERCIAL

## 2018-10-22 VITALS — DIASTOLIC BLOOD PRESSURE: 86 MMHG | HEART RATE: 91 BPM | SYSTOLIC BLOOD PRESSURE: 126 MMHG

## 2018-10-22 DIAGNOSIS — F33.2 SEVERE EPISODE OF RECURRENT MAJOR DEPRESSIVE DISORDER, WITHOUT PSYCHOTIC FEATURES (H): Primary | ICD-10-CM

## 2018-10-22 NOTE — MR AVS SNAPSHOT
After Visit Summary   10/22/2018    Samira Romero    MRN: [id number]           Patient Information     Date Of Birth          1960        Visit Information        Provider Department      10/22/2018 1:45 PM [facility] PROCEDURE ROOM [facility]        Today's Diagnoses     Severe episode of recurrent major depressive disorder, without psychotic features (H)    -  1       Follow-ups after your visit        Your next 10 appointments already scheduled     Oct 23, 2018  1:45 PM CDT   TMS TREATMENT with [facility] PROCEDURE ROOM   [facility] ([facility])    [address]  [facility] [address]   [phone]            Oct 24, 2018  1:45 PM CDT   TMS TREATMENT with [facility] PROCEDURE ROOM   [facility] ([facility])    [address]  [facility] [address]   [phone]            Oct 25, 2018  1:45 PM CDT   TMS TREATMENT with [facility] PROCEDURE ROOM   [facility] ([facility])    [address]  [facility] [address]   [phone]            Oct 26, 2018  1:45 PM CDT   TMS TREATMENT with [facility] PROCEDURE ROOM   [facility] ([facility])    [address]  [facility] [address]   [phone]            Oct 29, 2018  1:45 PM CDT   TMS TREATMENT with [facility] PROCEDURE ROOM   [facility] ([facility])    [address]  [facility] [address]   [phone]            Oct 30, 2018  1:45 PM CDT   TMS TREATMENT with [facility] PROCEDURE ROOM   [facility] ([facility])    [address]  [facility] [address]   [phone]            Oct 31, 2018  1:45 PM CDT   TMS TREATMENT with [facility] PROCEDURE ROOM   [facility] ([facility])    [address]  [facility] [address]   [phone]            Nov 01, 2018  1:45 PM CDT   TMS TREATMENT with [facility] PROCEDURE ROOM   [facility] ([facility])    [address]  [facility] [address] [phone]              Who to contact     Please call your clinic at [phone] to:    Ask questions about your health    Make or cancel appointments    Discuss your medicines    Learn about your test results    Speak to your doctor            Additional Information About Your Visit        [employer] Information     [employer] gives you secure access to your electronic health record. If you see a primary care provider, you can also send messages to your care team and make appointments. If you have questions, please call your primary care clinic.  If you do not have a primary care provider, please call [phone] and they will assist you.      [employer] is an electronic gateway that provides easy, online access to your medical records. With [employer], you can request a clinic appointment, read your test results, renew a prescription or communicate with your care team.     To access your existing account, please contact your [facility] or call [phone] for assistance.        Care EveryWhere ID     This is your Care EveryWhere ID. This could be used by other organizations to access your [facility] medical records  [id number]        Your Vitals Were     Pulse                   91            Blood Pressure from Last 3 Encounters:   10/22/18 126/86   10/19/18 138/78   10/18/18 129/76    Weight from Last 3 Encounters:   09/12/18 166 lb 12.8 oz (75.7 kg)   07/10/18 169 lb 6.4 oz (76.8 kg)              We Performed the Following     C TRANSCRANIAL MAGNETIC STIMULATION TREATMENT,DELIVERY/MANAGEMENT        Primary Care Provider Office Phone # Fax #    [facility] [phone] [address] [facility] [address]        Equal Access to Services     BIANCA KHAN AH: shellie Myles qaybta kaalmada adeegyada,  jaimee lamavalentino staton'aan ah. So [facility] [phone].    ATENCIÓN: Si nickla trent, tiene a cole disposición servicios gratuitos de asistencia lingüística. Rl parkinson [phone].    We comply with applicable federal civil rights laws and [place] laws. We do not discriminate on the basis of race, color, national origin, age, disability, sex, sexual orientation, or gender identity.            Thank you!     Thank you for choosing [facility]  for your care. Our goal is always to provide you with excellent care. Hearing back from our patients is one way we can continue to improve our services. Please take a few minutes to complete the written survey that you may receive in the mail after your visit with us. Thank you!             Your Updated Medication List - Protect others around you: Learn how to safely use, store and throw away your medicines at www.disposemymeds.org.          This list is accurate as of 10/22/18  4:07 PM.  Always use your most recent med list.                   Brand Name Dispense Instructions for use Diagnosis    ALPRAZolam 0.5 MG tablet    XANAX     CANCEL PRIOR RX -- UPDATED DOSE INSTRUCTIONS -- Take one-half to one tab by mouth at bedtime as needed for anxiety.        amphetamine-dextroamphetamine 30 MG per 24 hr capsule    ADDERALL XR     Take 30 mg by mouth        buPROPion 150 MG 24 hr tablet    WELLBUTRIN XL     TAKE 2 TABLETS EVERY       MORNING        cholecalciferol 5000 units Caps capsule    vitamin D3          Fish Oil 435 MG Caps      Take 1,200 mg by mouth        ketorolac 60 MG/2ML Soln injection    TORADOL     Inject 30-60 mg into the muscle        MULTIPLE VITAMINS-MINERALS PO           Octacosanol 1000-5 MCG-UNIT Tabs           PARoxetine 30 MG tablet    PAXIL     Take 60 mg by mouth

## 2018-10-22 NOTE — PROGRESS NOTES
[facility] TMS Program  [address], [address]  [place] [address]  TMS Procedure Note   Samira Romero MRN# [id number]  Age: 58 year old year old YOB: 1960    Pre-Procedure:  History and Physical: Reviewed in medical record  Consent Signed by: Samira Romero  On: 09/12/18    Clinical Narrative:  Pt tolerating treatment.      Indications for TMS:  MDD, recurrent, severe; 4+ medication trials (from 2+ classes) ineffective; Psychotherapy ineffective.     Pre-Procedure Diagnosis:  MDD, recurrent, severe F33.2    Treatment Hx:  Treatment number this series: 26  Total lifetime treatment number: 26    Allergies   Allergen Reactions     Codeine Nausea and Vomiting     Penicillins Rash      /86 (BP Location: Right arm, Patient Position: Sitting, Cuff Size: Adult Regular)  Pulse 91    Pause for the Cause  Right patient:  Yes  Right procedure/correct coil:  Yes; rTMS; cpt 83496; H1 coil.   Earplugs in place:  Yes    Procedure  Patient was seated in procedure chair. Identity and procedure was verified. Ear plugs were placed in ears and patient-specific cap was placed on head and tightened appropriately. Ruler locations were verified. Coil was placed at treatment location and stimulator was set to parameters described below. A test train was delivered and pt tolerated train. Given pt tolerance, 55 treatment trains were delivered. Pt tolerated procedure with some facial and right hand movement.    Motor Threshold Determination  Distance from nasion to inion: 36 cm  MT 1: 0 - 7- 14.5 @ 49% on 9/12/2018  MT 2: 0 - 7- 14.5 @ 49% on 9/27/2018  MT 3: 0 - 7- 14.5 @ 47% on 10/11/2018  MT 4: 0 - 7- 14.5 @ 46% on 10/18/2018    Stimulation Parameters  Frequency: 18 Hz     Train duration: 2 sec  Total pulses delivered: 1980  Inter-train interval: 20 sec  Tx Loc: 0 - eyebrows  - 14.5  Energy: 55% (120% MT)  Trains: 55 trains      Post-Procedure Diagnosis:  MDD, recurrent, severe F33.2      Cleve  Jose  [facility]      Plan   - Cont TMS     I didn't see the patient during/after treatment but remained available in the clinic during  treatment.    Jesus Carlson MD  [facility]

## 2018-10-23 ENCOUNTER — OFFICE VISIT (OUTPATIENT)
Dept: PSYCHIATRY | Facility: CLINIC | Age: 58
End: 2018-10-23
Payer: COMMERCIAL

## 2018-10-23 VITALS — HEART RATE: 92 BPM | SYSTOLIC BLOOD PRESSURE: 118 MMHG | DIASTOLIC BLOOD PRESSURE: 77 MMHG

## 2018-10-23 DIAGNOSIS — F33.2 SEVERE EPISODE OF RECURRENT MAJOR DEPRESSIVE DISORDER, WITHOUT PSYCHOTIC FEATURES (H): Primary | ICD-10-CM

## 2018-10-23 ASSESSMENT — PATIENT HEALTH QUESTIONNAIRE - PHQ9: SUM OF ALL RESPONSES TO PHQ QUESTIONS 1-9: 4

## 2018-10-23 NOTE — PROGRESS NOTES
[facility] TMS Program  [address], [address]  [place] [address]  TMS Procedure Note   Samira Romero MRN# [id number]  Age: 58 year old year old YOB: 1960    Pre-Procedure:  History and Physical: Reviewed in medical record  Consent Signed by: Samira Romero  On: 09/12/18    Clinical Narrative:  Pt tolerating treatment.      Indications for TMS:  MDD, recurrent, severe; 4+ medication trials (from 2+ classes) ineffective; Psychotherapy ineffective.     Pre-Procedure Diagnosis:  MDD, recurrent, severe F33.2    Treatment Hx:  Treatment number this series: 27  Total lifetime treatment number: 27    Allergies   Allergen Reactions     Codeine Nausea and Vomiting     Penicillins Rash      /77 (BP Location: Left arm, Patient Position: Sitting, Cuff Size: Adult Regular)  Pulse 92    Pause for the Cause  Right patient:  Yes  Right procedure/correct coil:  Yes; rTMS; cpt 99582; H1 coil.   Earplugs in place:  Yes    Procedure  Patient was seated in procedure chair. Identity and procedure was verified. Ear plugs were placed in ears and patient-specific cap was placed on head and tightened appropriately. Ruler locations were verified. Coil was placed at treatment location and stimulator was set to parameters described below. A test train was delivered and pt tolerated train. Given pt tolerance, 55 treatment trains were delivered. Pt tolerated procedure with some facial and right hand movement.    Motor Threshold Determination  Distance from nasion to inion: 36 cm  MT 1: 0 - 7- 14.5 @ 49% on 9/12/2018  MT 2: 0 - 7- 14.5 @ 49% on 9/27/2018  MT 3: 0 - 7- 14.5 @ 47% on 10/11/2018  MT 4: 0 - 7- 14.5 @ 46% on 10/18/2018    Stimulation Parameters  Frequency: 18 Hz     Train duration: 2 sec  Total pulses delivered: 1980  Inter-train interval: 20 sec  Tx Loc: 0 - eyebrows  - 14.5  Energy: 55% (120% MT)  Trains: 55 trains      Post-Procedure Diagnosis:  MDD, recurrent, severe F33.2      Yanci Goss  RN   [facility]      Plan   - Cont TMS     I didn't see the patient during/after treatment but remained available in the clinic during  treatment.    JONATHAN Snow MD  [facility]

## 2018-10-23 NOTE — MR AVS SNAPSHOT
After Visit Summary   10/23/2018    Samira Romero    MRN: [id number]           Patient Information     Date Of Birth          1960        Visit Information        Provider Department      10/23/2018 1:45 PM ME [facility] PROCEDURE ROOM [facility] Psychiatry        Today's Diagnoses     Severe episode of recurrent major depressive disorder, without psychotic features (H)    -  1       Follow-ups after your visit        Your next 10 appointments already scheduled     Oct 25, 2018  1:45 PM CDT   TMS TREATMENT with ME [facility] PROCEDURE ROOM   [facility] Psychiatry ([facility])    [address]  [facility] [address]   [phone]            Oct 26, 2018  1:45 PM CDT   TMS TREATMENT with ME [facility] PROCEDURE ROOM   [facility] ([facility])    [address]  [facility] [address]   [phone]            Oct 29, 2018  1:45 PM CDT   TMS TREATMENT with ME [facility] PROCEDURE ROOM   [facility] ([facility])    [address]  [facility] [address]   [phone]            Oct 30, 2018  1:45 PM CDT   TMS TREATMENT with ME [facility] PROCEDURE ROOM   [facility] ([facility])    [address]  [facility] [address]   [phone]            Oct 31, 2018  1:45 PM CDT   TMS TREATMENT with ME [facility] PROCEDURE ROOM   [facility] ([facility])    [address]  [facility] [address]   [phone]            Nov 01, 2018  1:45 PM CDT   TMS TREATMENT with ME UMP PROCEDURE ROOM   P Psychiatry ([facility])    [address]  [facility] [address]   [phone]              Who to contact     Please call your clinic at [phone] to:    Ask questions about your health    Make or cancel appointments    Discuss your medicines    Learn about your test results    Speak to your doctor            Additional Information About Your Visit        Janie  Information     [facility] gives you secure access to your electronic health record. If you see a primary care provider, you can also send messages to your care team and make appointments. If you have questions, please call your primary care clinic.  If you do not have a primary care provider, please call [phone] and they will assist you.      [facility] [facility] is an electronic gateway that provides easy, online access to your medical records. With [facility], you can request a clinic appointment, read your test results, renew a prescription or communicate with your care team.     To access your existing account, please contact your [facility] or call [phone] for assistance.        Care EveryWhere ID     This is your Care EveryWhere ID. This could be used by other organizations to access your [place] medical records  [id number]        Your Vitals Were     Pulse                   92            Blood Pressure from Last 3 Encounters:   10/23/18 118/77   10/22/18 126/86   10/19/18 138/78    Weight from Last 3 Encounters:   09/12/18 75.7 kg (166 lb 12.8 oz)   07/10/18 76.8 kg (169 lb 6.4 oz)              We Performed the Following     C TRANSCRANIAL MAGNETIC STIMULATION TREATMENT,DELIVERY/MANAGEMENT        Primary Care Provider Office Phone # Fax #    [facility] [phone] [address] [facility] [address]        Equal Access to Services     BIANCA KHAN : Hadii faye ku hadasho Sovitaali, waaxda luqadaha, qaybta kaalmada kodak, jaimee robertson. So [facility] [phone].    ATENCIÓN: Si habla español, tiene a cole disposición servicios gratuitos de asistencia lingüística. Rl al [phone].    We comply with applicable federal civil rights laws and [place] laws. We do not discriminate on the basis of race, color, national origin, age, disability, sex, sexual orientation, or gender identity.             Thank you!     Thank you for choosing [facility] PSY[facility]  for your care. Our goal is always to provide you with excellent care. Hearing back from our patients is one way we can continue to improve our services. Please take a few minutes to complete the written survey that you may receive in the mail after your visit with us. Thank you!             Your Updated Medication List - Protect others around you: Learn how to safely use, store and throw away your medicines at www.disposemymeds.org.          This list is accurate as of 10/23/18 11:59 PM.  Always use your most recent med list.                   Brand Name Dispense Instructions for use Diagnosis    ALPRAZolam 0.5 MG tablet    XANAX     CANCEL PRIOR RX -- UPDATED DOSE INSTRUCTIONS -- Take one-half to one tab by mouth at bedtime as needed for anxiety.        amphetamine-dextroamphetamine 30 MG per 24 hr capsule    ADDERALL XR     Take 30 mg by mouth        buPROPion 150 MG 24 hr tablet    WELLBUTRIN XL     TAKE 2 TABLETS EVERY       MORNING        cholecalciferol 5000 units Caps capsule    vitamin D3          Fish Oil 435 MG Caps      Take 1,200 mg by mouth        ketorolac 60 MG/2ML Soln injection    TORADOL     Inject 30-60 mg into the muscle        MULTIPLE VITAMINS-MINERALS PO           Octacosanol 1000-5 MCG-UNIT Tabs           PARoxetine 30 MG tablet    PAXIL     Take 60 mg by mouth

## 2018-10-24 ENCOUNTER — OFFICE VISIT (OUTPATIENT)
Dept: PSYCHIATRY | Facility: CLINIC | Age: 58
End: 2018-10-24
Payer: COMMERCIAL

## 2018-10-24 VITALS — SYSTOLIC BLOOD PRESSURE: 125 MMHG | HEART RATE: 105 BPM | DIASTOLIC BLOOD PRESSURE: 87 MMHG

## 2018-10-24 DIAGNOSIS — F33.2 SEVERE EPISODE OF RECURRENT MAJOR DEPRESSIVE DISORDER, WITHOUT PSYCHOTIC FEATURES (H): Primary | ICD-10-CM

## 2018-10-24 ASSESSMENT — PATIENT HEALTH QUESTIONNAIRE - PHQ9
SUM OF ALL RESPONSES TO PHQ QUESTIONS 1-9: 6
SUM OF ALL RESPONSES TO PHQ QUESTIONS 1-9: 5

## 2018-10-24 NOTE — PROGRESS NOTES
[facility] TMS Program  [address], [address]  [place] [address]  TMS Procedure Note   Samira Romero MRN# [id number]  Age: 58 year old year old YOB: 1960    Pre-Procedure:  History and Physical: Reviewed in medical record  Consent Signed by: Samira Romero  On: 09/12/18    Clinical Narrative:  Pt tolerating treatment.      Indications for TMS:  MDD, recurrent, severe; 4+ medication trials (from 2+ classes) ineffective; Psychotherapy ineffective.     Pre-Procedure Diagnosis:  MDD, recurrent, severe F33.2    Treatment Hx:  Treatment number this series: 28  Total lifetime treatment number: 28    Allergies   Allergen Reactions     Codeine Nausea and Vomiting     Penicillins Rash      /87 (BP Location: Left arm, Patient Position: Sitting, Cuff Size: Adult Regular)  Pulse 105    Pause for the Cause  Right patient:  Yes  Right procedure/correct coil:  Yes; rTMS; cpt 22198; H1 coil.   Earplugs in place:  Yes    Procedure  Patient was seated in procedure chair. Identity and procedure was verified. Ear plugs were placed in ears and patient-specific cap was placed on head and tightened appropriately. Ruler locations were verified. Coil was placed at treatment location and stimulator was set to parameters described below. A test train was delivered and pt tolerated train. Given pt tolerance, 55 treatment trains were delivered. Pt tolerated procedure with some facial and right hand movement.    Motor Threshold Determination  Distance from nasion to inion: 36 cm  MT 1: 0 - 7- 14.5 @ 49% on 9/12/2018  MT 2: 0 - 7- 14.5 @ 49% on 9/27/2018  MT 3: 0 - 7- 14.5 @ 47% on 10/11/2018  MT 4: 0 - 7- 14.5 @ 46% on 10/18/2018    Stimulation Parameters  Frequency: 18 Hz     Train duration: 2 sec  Total pulses delivered: 1980  Inter-train interval: 20 sec  Tx Loc: 0 - eyebrows  - 14.5  Energy: 55% (120% MT)  Trains: 55 trains      Post-Procedure Diagnosis:  MDD, recurrent, severe F33.2      Yanci  YASSINE Goss   [facility]      Plan   - Cont TMS     I didn't see the patient during/after treatment but remained available in the clinic during  treatment.    Alina Mcintyre MD  [facility]

## 2018-10-24 NOTE — MR AVS SNAPSHOT
After Visit Summary   10/24/2018    Samira Romeor    MRN: [id number]           Patient Information     Date Of Birth          1960        Visit Information        Provider Department      10/24/2018 1:45 PM ME [facility] PROCEDURE ROOM [facility] Psychiatry        Today's Diagnoses     Severe episode of recurrent major depressive disorder, without psychotic features (H)    -  1       Follow-ups after your visit        Your next 10 appointments already scheduled     Oct 25, 2018  1:45 PM CDT   TMS TREATMENT with ME [facility] PROCEDURE ROOM   [facility] Psychiatry ([facility])    [address]  [facility] [address]   [phone]            Oct 26, 2018  1:45 PM CDT   TMS TREATMENT with ME UMP PROCEDURE ROOM   [facility] Psych[facility] ([facility])    [address]  [facility] [address]   [phone]            Oct 29, 2018  1:45 PM CDT   TMS TREATMENT with ME [facility] PROCEDURE ROOM   [facility] ([facility])    [address]  [facility] [address]   [phone]            Oct 30, 2018  1:45 PM CDT   TMS TREATMENT with ME [facility] PROCEDURE ROOM   [facility] ([facility])    [address]  [facility] [address]   [phone]            Oct 31, 2018  1:45 PM CDT   TMS TREATMENT with ME [facility] PROCEDURE ROOM   [facility] ([facility])    [address]  [facility] [address]   [phone]            Nov 01, 2018  1:45 PM CDT   TMS TREATMENT with ME UMP PROCEDURE ROOM   P Psychiatry ([facility])    [address]  [facility] [address]   [phone]              Who to contact     Please call your clinic at [phone] to:    Ask questions about your health    Make or cancel appointments    Discuss your medicines    Learn about your test results    Speak to your doctor            Additional Information About Your Visit        Janie  Information     [employer] gives you secure access to your electronic health record. If you see a primary care provider, you can also send messages to your care team and make appointments. If you have questions, please call your primary care clinic.  If you do not have a primary care provider, please call [phone] and they will assist you.      [employer] is an electronic gateway that provides easy, online access to your medical records. With [employer], you can request a clinic appointment, read your test results, renew a prescription or communicate with your care team.     To access your existing account, please contact your [facility] or call [phone] for assistance.        Care EveryWhere ID     This is your Care EveryWhere ID. This could be used by other organizations to access your [place] medical records  [id number]        Your Vitals Were     Pulse                   105            Blood Pressure from Last 3 Encounters:   10/24/18 125/87   10/23/18 118/77   10/22/18 126/86    Weight from Last 3 Encounters:   09/12/18 75.7 kg (166 lb 12.8 oz)   07/10/18 76.8 kg (169 lb 6.4 oz)              We Performed the Following     C TRANSCRANIAL MAGNETIC STIMULATION TREATMENT,DELIVERY/MANAGEMENT        Primary Care Provider Office Phone # Fax #    [facility] [phone] [address] [address]        Equal Access to Services     BIANCA KHAN : Hadii faye ku hadasho Sovitaali, waaxda luqadaha, qaybta kaalmada kodak, jaimee robertson. So [facility] [phone].    ATENCIÓN: Si habla español, tiene a cole disposición servicios gratuitos de asistencia lingüística. Rl al [phone].    We comply with applicable federal civil rights laws and [place] laws. We do not discriminate on the basis of race, color, national origin, age, disability, sex, sexual orientation, or gender identity.             Thank you!     Thank you for choosing [facility]  for your care. Our goal is always to provide you with excellent care. Hearing back from our patients is one way we can continue to improve our services. Please take a few minutes to complete the written survey that you may receive in the mail after your visit with us. Thank you!             Your Updated Medication List - Protect others around you: Learn how to safely use, store and throw away your medicines at www.disposemymeds.org.          This list is accurate as of 10/24/18  4:02 PM.  Always use your most recent med list.                   Brand Name Dispense Instructions for use Diagnosis    ALPRAZolam 0.5 MG tablet    XANAX     CANCEL PRIOR RX -- UPDATED DOSE INSTRUCTIONS -- Take one-half to one tab by mouth at bedtime as needed for anxiety.        amphetamine-dextroamphetamine 30 MG per 24 hr capsule    ADDERALL XR     Take 30 mg by mouth        buPROPion 150 MG 24 hr tablet    WELLBUTRIN XL     TAKE 2 TABLETS EVERY       MORNING        cholecalciferol 5000 units Caps capsule    vitamin D3          Fish Oil 435 MG Caps      Take 1,200 mg by mouth        ketorolac 60 MG/2ML Soln injection    TORADOL     Inject 30-60 mg into the muscle        MULTIPLE VITAMINS-MINERALS PO           Octacosanol 1000-5 MCG-UNIT Tabs           PARoxetine 30 MG tablet    PAXIL     Take 60 mg by mouth

## 2018-10-25 ENCOUNTER — OFFICE VISIT (OUTPATIENT)
Dept: PSYCHIATRY | Facility: CLINIC | Age: 58
End: 2018-10-25
Payer: COMMERCIAL

## 2018-10-25 VITALS — DIASTOLIC BLOOD PRESSURE: 75 MMHG | SYSTOLIC BLOOD PRESSURE: 122 MMHG | HEART RATE: 86 BPM

## 2018-10-25 DIAGNOSIS — F33.2 SEVERE EPISODE OF RECURRENT MAJOR DEPRESSIVE DISORDER, WITHOUT PSYCHOTIC FEATURES (H): Primary | ICD-10-CM

## 2018-10-25 ASSESSMENT — PATIENT HEALTH QUESTIONNAIRE - PHQ9: SUM OF ALL RESPONSES TO PHQ QUESTIONS 1-9: 4

## 2018-10-25 NOTE — MR AVS SNAPSHOT
After Visit Summary   10/25/2018    Samira Romero    MRN: [id number]           Patient Information     Date Of Birth          1960        Visit Information        Provider Department      10/25/2018 1:45 PM ME [facility] PROCEDURE ROOM [facility] Psychiatry        Today's Diagnoses     Severe episode of recurrent major depressive disorder, without psychotic features (H)    -  1       Follow-ups after your visit        Your next 10 appointments already scheduled     Oct 29, 2018  1:45 PM CDT   TMS TREATMENT with ME UMP PROCEDURE ROOM   [facility] Psychiatry ([facility])    [address]  [facility] [address]   [phone]            Oct 30, 2018  1:45 PM CDT   TMS TREATMENT with ME UMP PROCEDURE ROOM   [facility] Psych[facility] ([facility])    [address]  [facility] [address] [phone]            Oct 31, 2018  1:45 PM CDT   TMS TREATMENT with ME UMP PROCEDURE ROOM   P Psychiatry ([facility])    [address]  [facility] [address] [phone]            Nov 01, 2018  1:45 PM CDT   TMS TREATMENT with ME [facility] PROCEDURE ROOM   [facility] Psychiatry ([facility])    [address]  [facility] [address] [phone]              Who to contact     Please call your clinic at [phone] to:    Ask questions about your health    Make or cancel appointments    Discuss your medicines    Learn about your test results    Speak to your doctor            Additional Information About Your Visit        [employer] Information     [employer] gives you secure access to your electronic health record. If you see a primary care provider, you can also send messages to your care team and make appointments. If you have questions, please call your primary care clinic.  If you do not have a primary care provider, please call [phone] and they will assist you.      [employer] is an electronic gateway that provides  easy, online access to your medical records. With [employer], you can request a clinic appointment, read your test results, renew a prescription or communicate with your care team.     To access your existing account, please contact your [facility] or call [phone] for assistance.        Care EveryWhere ID     This is your Care EveryWhere ID. This could be used by other organizations to access your [place] medical records  [id number]        Your Vitals Were     Pulse                   86            Blood Pressure from Last 3 Encounters:   10/26/18 124/75   10/25/18 122/75   10/24/18 125/87    Weight from Last 3 Encounters:   09/12/18 75.7 kg (166 lb 12.8 oz)   07/10/18 76.8 kg (169 lb 6.4 oz)              We Performed the Following     C REPET TMS TX SUBSEQ MOTR THRESHLD W/DELIV & MNGT        Primary Care Provider Office Phone # Fax #    [facility] [phone] [address] [facility] [address]        Equal Access to Services     [facility]: Hadii aad ku hadasho Soomaali, waaxda luqadaha, qaybta kaalmada adeegyada, waxay idiin hayaan priscila hansen . So [facility] [phone].    ATENCIÓN: Si habla español, tiene a cole disposición servicios gratuitos de asistencia lingüística. Llame al [phone].    We comply with applicable federal civil rights laws and [place] laws. We do not discriminate on the basis of race, color, national origin, age, disability, sex, sexual orientation, or gender identity.            Thank you!     Thank you for choosing [facility] PS[facility]  for your care. Our goal is always to provide you with excellent care. Hearing back from our patients is one way we can continue to improve our services. Please take a few minutes to complete the written survey that you may receive in the mail after your visit with us. Thank you!             Your Updated Medication List - Protect others around you: Learn  how to safely use, store and throw away your medicines at www.disposemymeds.org.          This list is accurate as of 10/25/18 11:59 PM.  Always use your most recent med list.                   Brand Name Dispense Instructions for use Diagnosis    ALPRAZolam 0.5 MG tablet    XANAX     CANCEL PRIOR RX -- UPDATED DOSE INSTRUCTIONS -- Take one-half to one tab by mouth at bedtime as needed for anxiety.        amphetamine-dextroamphetamine 30 MG per 24 hr capsule    ADDERALL XR     Take 30 mg by mouth        buPROPion 150 MG 24 hr tablet    WELLBUTRIN XL     TAKE 2 TABLETS EVERY       MORNING        cholecalciferol 5000 units Caps capsule    vitamin D3          Fish Oil 435 MG Caps      Take 1,200 mg by mouth        ketorolac 60 MG/2ML Soln injection    TORADOL     Inject 30-60 mg into the muscle        MULTIPLE VITAMINS-MINERALS PO           Octacosanol 1000-5 MCG-UNIT Tabs           PARoxetine 30 MG tablet    PAXIL     Take 60 mg by mouth

## 2018-10-25 NOTE — PROGRESS NOTES
[facility] TMS Program  [address], [address]  [place] [address]  TMS Procedure Note   Samira Romero MRN# [id number]  Age: 58 year old year old YOB: 1960    Pre-Procedure:  History and Physical: Reviewed in medical record  Consent Signed by: Samira Romero  On: 09/12/18    Clinical Narrative:  Pt tolerating treatment.      Indications for TMS:  MDD, recurrent, severe; 4+ medication trials (from 2+ classes) ineffective; Psychotherapy ineffective.     Pre-Procedure Diagnosis:  MDD, recurrent, severe F33.2    Treatment Hx:  Treatment number this series: 29  Total lifetime treatment number: 29    Allergies   Allergen Reactions     Codeine Nausea and Vomiting     Penicillins Rash      There were no vitals taken for this visit.    Pause for the Cause  Right patient:  Yes  Right procedure/correct coil:  Yes; rTMS; cpt 80135; H1 coil.   Earplugs in place:  Yes    Procedure  Patient was seated in procedure chair. Identity and procedure was verified. Ear plugs were placed in ears and patient-specific cap was placed on head and tightened appropriately. Ruler locations were verified. Coil was placed at initial MT location and stimulator was set to initial MT. MT was retested and found as described below. Coil was placed at treatment location and stimulator was set to stimulation parameters detailed below. A test train was delivered and pt tolerated train fine. Given pt tolerance, 55 trains were delivered. Pt tolerated procedure well.    Motor Threshold Determination  Distance from nasion to inion: 36 cm  MT 1: 0 - 7 - 14.5 @ 49% on 9/12/2018  MT 2: 0 - 7 - 14.5 @ 49% on 9/27/2018  MT 3: 0 - 7 - 14.5 @ 47% on 10/11/2018  MT 4: 0 - 7 - 14.5 @ 46% on 10/18/2018  MT 5: 0 - 7 - 14.5 @ 46% on 10/25/2018    Stimulation Parameters  Frequency: 18 Hz     Train duration: 2 sec  Total pulses delivered: 1980  Inter-train interval: 20 sec  Tx Loc: 0 - eyebrows  - 14.5  Energy: 55% (120% MT)  Trains: 55  trains      Post-Procedure Diagnosis:  MDD, recurrent, severe F33.2      Yanci Goss RN   [facility]od[facility]      Plan   - Cont TMS      I completed repeat determination of the pt's motor threshold during the visit today. I was available in the clinic throughout the treatment.    Cristine Snow MD  [facility]

## 2018-10-26 ENCOUNTER — OFFICE VISIT (OUTPATIENT)
Dept: PSYCHIATRY | Facility: CLINIC | Age: 58
End: 2018-10-26
Payer: COMMERCIAL

## 2018-10-26 VITALS — DIASTOLIC BLOOD PRESSURE: 75 MMHG | HEART RATE: 90 BPM | SYSTOLIC BLOOD PRESSURE: 124 MMHG

## 2018-10-26 DIAGNOSIS — F33.2 SEVERE EPISODE OF RECURRENT MAJOR DEPRESSIVE DISORDER, WITHOUT PSYCHOTIC FEATURES (H): Primary | ICD-10-CM

## 2018-10-26 ASSESSMENT — PATIENT HEALTH QUESTIONNAIRE - PHQ9: SUM OF ALL RESPONSES TO PHQ QUESTIONS 1-9: 1

## 2018-10-26 NOTE — MR AVS SNAPSHOT
After Visit Summary   10/26/2018    Samira Romero    MRN: [id number]           Patient Information     Date Of Birth          1960        Visit Information        Provider Department      10/26/2018 1:45 PM [facility] PROCEDURE ROOM [facility]        Today's Diagnoses     Severe episode of recurrent major depressive disorder, without psychotic features (H)    -  1       Follow-ups after your visit        Your next 10 appointments already scheduled     Oct 29, 2018  1:45 PM CDT   TMS TREATMENT with [facility] PROCEDURE ROOM   [facility] ([facility])    [address]  [facility] [address]   [phone]            Oct 30, 2018  1:45 PM CDT   TMS TREATMENT with ME [facility] PROCEDURE ROOM   [facility] ([facility])    [address]  [facility] [address] [phone]            Oct 31, 2018  1:45 PM CDT   TMS TREATMENT with ME [facility] PROCEDURE ROOM   [facility]iat[facility] ([facility])    [address]  [facility] [address] [phone]            Nov 01, 2018  1:45 PM CDT   TMS TREATMENT with ME [facility] PROCEDURE ROOM   [facility] ([facility])    [address]  [facility] [address] [phone]              Who to contact     Please call your clinic at [phone] to:    Ask questions about your health    Make or cancel appointments    Discuss your medicines    Learn about your test results    Speak to your doctor            Additional Information About Your Visit        [employer] Information     [employer] gives you secure access to your electronic health record. If you see a primary care provider, you can also send messages to your care team and make appointments. If you have questions, please call your primary care clinic.  If you do not have a primary care provider, please call [phone] and they will assist you.      [employer] is an electronic gateway that provides  easy, online access to your medical records. With [employer], you can request a clinic appointment, read your test results, renew a prescription or communicate with your care team.     To access your existing account, please contact your [facility] or call [phone] for assistance.        Care EveryWhere ID     This is your Care EveryWhere ID. This could be used by other organizations to access your [place] medical records  [id number]        Your Vitals Were     Pulse                   90            Blood Pressure from Last 3 Encounters:   10/26/18 124/75   10/25/18 122/75   10/24/18 125/87    Weight from Last 3 Encounters:   09/12/18 75.7 kg (166 lb 12.8 oz)   07/10/18 76.8 kg (169 lb 6.4 oz)              We Performed the Following     C TRANSCRANIAL MAGNETIC STIMULATION TREATMENT,DELIVERY/MANAGEMENT        Primary Care Provider Office Phone # Fax #    [facility] [phone] [address] [facility] [address]        Equal Access to Services     BIANCA KHAN : Hadii aad ku hadasho Soomaali, waaxda luqadaha, qaybta kaalmada adeegyada, waxay idiin haybilln priscila hansen . So [facility] [phone].    ATENCIÓN: Si habla español, tiene a cole disposición servicios gratuitos de asistencia lingüística. Llame al [phone].    We comply with applicable federal civil rights laws and [place] laws. We do not discriminate on the basis of race, color, national origin, age, disability, sex, sexual orientation, or gender identity.            Thank you!     Thank you for choosing [facility] PSYCHIATRY  for your care. Our goal is always to provide you with excellent care. Hearing back from our patients is one way we can continue to improve our services. Please take a few minutes to complete the written survey that you may receive in the mail after your visit with us. Thank you!             Your Updated Medication List - Protect others  around you: Learn how to safely use, store and throw away your medicines at www.disposemymeds.org.          This list is accurate as of 10/26/18 11:59 PM.  Always use your most recent med list.                   Brand Name Dispense Instructions for use Diagnosis    ALPRAZolam 0.5 MG tablet    XANAX     CANCEL PRIOR RX -- UPDATED DOSE INSTRUCTIONS -- Take one-half to one tab by mouth at bedtime as needed for anxiety.        amphetamine-dextroamphetamine 30 MG per 24 hr capsule    ADDERALL XR     Take 30 mg by mouth        buPROPion 150 MG 24 hr tablet    WELLBUTRIN XL     TAKE 2 TABLETS EVERY       MORNING        cholecalciferol 5000 units Caps capsule    vitamin D3          Fish Oil 435 MG Caps      Take 1,200 mg by mouth        ketorolac 60 MG/2ML Soln injection    TORADOL     Inject 30-60 mg into the muscle        MULTIPLE VITAMINS-MINERALS PO           Octacosanol 1000-5 MCG-UNIT Tabs           PARoxetine 30 MG tablet    PAXIL     Take 60 mg by mouth

## 2018-10-26 NOTE — PROGRESS NOTES
[facility] TMS Program  [address]  [place] [address]  TMS Procedure Note   Samira Romero MRN# [id number]  Age: 58 year old year old YOB: 1960    Pre-Procedure:  History and Physical: Reviewed in medical record  Consent Signed by: Samira Romero  On: 09/12/18    Clinical Narrative:  Pt tolerating treatment.      Indications for TMS:  MDD, recurrent, severe; 4+ medication trials (from 2+ classes) ineffective; Psychotherapy ineffective.     Pre-Procedure Diagnosis:  MDD, recurrent, severe F33.2    Treatment Hx:  Treatment number this series: 30  Total lifetime treatment number: 30    Allergies   Allergen Reactions     Codeine Nausea and Vomiting     Penicillins Rash      There were no vitals taken for this visit.    Pause for the Cause  Right patient:  Yes  Right procedure/correct coil:  Yes; rTMS; cpt 92073; H1 coil.   Earplugs in place:  Yes    Procedure  Patient was seated in procedure chair. Identity and procedure was verified. Ear plugs were placed in ears and patient-specific cap was placed on head and tightened appropriately. Ruler locations were verified. Coil was placed at treatment location and stimulator was set to parameters described below. A test train was delivered and pt tolerated train. Given pt tolerance, 55 treatment trains were delivered. Pt tolerated procedure with some facial and right hand movement.    Motor Threshold Determination  Distance from nasion to inion: 36 cm  MT 1: 0 - 7- 14.5 @ 49% on 9/12/2018  MT 2: 0 - 7- 14.5 @ 49% on 9/27/2018  MT 3: 0 - 7- 14.5 @ 47% on 10/11/2018  MT 4: 0 - 7- 14.5 @ 46% on 10/18/2018    Stimulation Parameters  Frequency: 18 Hz     Train duration: 2 sec  Total pulses delivered: 1980  Inter-train interval: 20 sec  Tx Loc: 0 - eyebrows  - 14.5  Energy: 55% (120% MT)  Trains: 55 trains      Post-Procedure Diagnosis:  MDD, recurrent, severe F33.2      Meghan Ortega RN  [facility]  Neuromodulation      Plan   - Cont TMS    I didn t see the patient during/after treatment but remained available in the clinic during  treatment.    Cristine Snow MD  [facility] Neuromodulation

## 2018-10-29 ENCOUNTER — OFFICE VISIT (OUTPATIENT)
Dept: PSYCHIATRY | Facility: CLINIC | Age: 58
End: 2018-10-29
Payer: COMMERCIAL

## 2018-10-29 VITALS — HEART RATE: 90 BPM | SYSTOLIC BLOOD PRESSURE: 158 MMHG | DIASTOLIC BLOOD PRESSURE: 83 MMHG

## 2018-10-29 DIAGNOSIS — F33.2 SEVERE EPISODE OF RECURRENT MAJOR DEPRESSIVE DISORDER, WITHOUT PSYCHOTIC FEATURES (H): Primary | ICD-10-CM

## 2018-10-29 ASSESSMENT — PATIENT HEALTH QUESTIONNAIRE - PHQ9: SUM OF ALL RESPONSES TO PHQ QUESTIONS 1-9: 3

## 2018-10-29 NOTE — PROGRESS NOTES
[facility] TMS Program  [address]  [place] [address]  TMS Procedure Note   Samira Romero MRN# [id number]  Age: 58 year old year old YOB: 1960    Pre-Procedure:  History and Physical: Reviewed in medical record  Consent Signed by: Samira Romero  On: 09/12/18    Clinical Narrative:  Pt tolerating treatment.      Indications for TMS:  MDD, recurrent, severe; 4+ medication trials (from 2+ classes) ineffective; Psychotherapy ineffective.     Pre-Procedure Diagnosis:  MDD, recurrent, severe F33.2    Treatment Hx:  Treatment number this series: 31  Total lifetime treatment number: 31    Allergies   Allergen Reactions     Codeine Nausea and Vomiting     Penicillins Rash      /83 (BP Location: Left arm, Patient Position: Sitting, Cuff Size: Adult Regular)  Pulse 90    Pause for the Cause  Right patient:  Yes  Right procedure/correct coil:  Yes; rTMS; cpt 08871; H1 coil.   Earplugs in place:  Yes    Procedure  Patient was seated in procedure chair. Identity and procedure was verified. Ear plugs were placed in ears and patient-specific cap was placed on head and tightened appropriately. Ruler locations were verified. Coil was placed at treatment location and stimulator was set to parameters described below. A test train was delivered and pt tolerated train. Given pt tolerance, 55 treatment trains were delivered. Pt tolerated procedure with some facial and right hand movement.    Motor Threshold Determination  Distance from nasion to inion: 36 cm  MT 1: 0 - 7- 14.5 @ 49% on 9/12/2018  MT 2: 0 - 7- 14.5 @ 49% on 9/27/2018  MT 3: 0 - 7- 14.5 @ 47% on 10/11/2018  MT 4: 0 - 7- 14.5 @ 46% on 10/18/2018    Stimulation Parameters  Frequency: 18 Hz     Train duration: 2 sec  Total pulses delivered: 1980  Inter-train interval: 20 sec  Tx Loc: 0 - eyebrows  - 14.5  Energy: 55% (120% MT)  Trains: 55 trains      Post-Procedure Diagnosis:  MDD, recurrent, severe F33.2      Yanci Goss  RN   [facility] Neuromodulation      Plan   - Cont TMS    I didn t see the patient during/after treatment but remained available in the clinic during  treatment.    Jesus Carlson MD  [facility] Neuromodulation

## 2018-10-29 NOTE — MR AVS SNAPSHOT
After Visit Summary   10/29/2018    Samira Romero    MRN: [id number]           Patient Information     Date Of Birth          1960        Visit Information        Provider Department      10/29/2018 1:45 PM [facility] PROCEDURE ROOM [facility] Psychiatry        Today's Diagnoses     Severe episode of recurrent major depressive disorder, without psychotic features (H)    -  1       Follow-ups after your visit        Your next 10 appointments already scheduled     Oct 30, 2018  1:45 PM CDT   TMS TREATMENT with ME UMP PROCEDURE ROOM   [facility] Psychiatry ([facility])    [address]  [facility] [address] [phone]            Oct 31, 2018  1:45 PM CDT   TMS TREATMENT with ME [facility] PROCEDURE ROOM   [facility] Psychiatry ([facility])    [address]  [facility] [id number] [phone]            Nov 01, 2018  1:45 PM CDT   TMS TREATMENT with ME [facility] PROCEDURE ROOM   [facility] Psychiatry ([facility])    [address]  [facility] [address] [phone]              Who to contact     Please call your clinic at [phone] to:    Ask questions about your health    Make or cancel appointments    Discuss your medicines    Learn about your test results    Speak to your doctor            Additional Information About Your Visit        [employer] Information     [employer] gives you secure access to your electronic health record. If you see a primary care provider, you can also send messages to your care team and make appointments. If you have questions, please call your primary care clinic.  If you do not have a primary care provider, please call [phone] and they will assist you.      [employer] is an electronic gateway that provides easy, online access to your medical records. With [employer], you can request a clinic appointment, read your test results, renew a prescription or communicate with your care team.     To access your  existing account, please contact your [facility] or call [phone] for assistance.        Care EveryWhere ID     This is your Care EveryWhere ID. This could be used by other organizations to access your [place] medical records  [id number]        Your Vitals Were     Pulse                   90            Blood Pressure from Last 3 Encounters:   10/29/18 158/83   10/26/18 124/75   10/25/18 122/75    Weight from Last 3 Encounters:   09/12/18 166 lb 12.8 oz (75.7 kg)   07/10/18 169 lb 6.4 oz (76.8 kg)              We Performed the Following     C TRANSCRANIAL MAGNETIC STIMULATION TREATMENT,DELIVERY/MANAGEMENT        Primary Care Provider Office Phone # Fax #    [facility] [phone] [address] [facility] [address]        Equal Access to Services     BIANCA KHAN : Hadii faye jimenez hadasho Socassandra, waaxda luqadaha, qaybta kaalmada adeegyada, jiamee yanez haymattie hansen . So [facility] [phone].    ATENCIÓN: Si habla español, tiene a cole disposición servicios gratuitos de asistencia lingüística. Rl al [phone].    We comply with applicable federal civil rights laws and [place] laws. We do not discriminate on the basis of race, color, national origin, age, disability, sex, sexual orientation, or gender identity.            Thank you!     Thank you for choosing [facility] PSYCHIATRY  for your care. Our goal is always to provide you with excellent care. Hearing back from our patients is one way we can continue to improve our services. Please take a few minutes to complete the written survey that you may receive in the mail after your visit with us. Thank you!             Your Updated Medication List - Protect others around you: Learn how to safely use, store and throw away your medicines at www.disposemymeds.org.          This list is accurate as of 10/29/18  4:30 PM.  Always use your most recent med list.                    Brand Name Dispense Instructions for use Diagnosis    ALPRAZolam 0.5 MG tablet    XANAX     CANCEL PRIOR RX -- UPDATED DOSE INSTRUCTIONS -- Take one-half to one tab by mouth at bedtime as needed for anxiety.        amphetamine-dextroamphetamine 30 MG per 24 hr capsule    ADDERALL XR     Take 30 mg by mouth        buPROPion 150 MG 24 hr tablet    WELLBUTRIN XL     TAKE 2 TABLETS EVERY       MORNING        cholecalciferol 5000 units Caps capsule    vitamin D3          Fish Oil 435 MG Caps      Take 1,200 mg by mouth        ketorolac 60 MG/2ML Soln injection    TORADOL     Inject 30-60 mg into the muscle        MULTIPLE VITAMINS-MINERALS PO           Octacosanol 1000-5 MCG-UNIT Tabs           PARoxetine 30 MG tablet    PAXIL     Take 60 mg by mouth

## 2018-10-30 ENCOUNTER — OFFICE VISIT (OUTPATIENT)
Dept: PSYCHIATRY | Facility: CLINIC | Age: 58
End: 2018-10-30
Payer: COMMERCIAL

## 2018-10-30 VITALS — HEART RATE: 81 BPM | SYSTOLIC BLOOD PRESSURE: 108 MMHG | DIASTOLIC BLOOD PRESSURE: 78 MMHG

## 2018-10-30 DIAGNOSIS — F33.2 SEVERE EPISODE OF RECURRENT MAJOR DEPRESSIVE DISORDER, WITHOUT PSYCHOTIC FEATURES (H): Primary | ICD-10-CM

## 2018-10-30 ASSESSMENT — PATIENT HEALTH QUESTIONNAIRE - PHQ9: SUM OF ALL RESPONSES TO PHQ QUESTIONS 1-9: 5

## 2018-10-30 NOTE — PROGRESS NOTES
[facility] TMS Program  [address]  [place] [address]  TMS Procedure Note   Samira Romero MRN# [id number]  Age: 58 year old year old YOB: 1960    Pre-Procedure:  History and Physical: Reviewed in medical record  Consent Signed by: Samira Romero  On: 09/12/18    Clinical Narrative:  Pt tolerating treatment.      Indications for TMS:  MDD, recurrent, severe; 4+ medication trials (from 2+ classes) ineffective; Psychotherapy ineffective.     Pre-Procedure Diagnosis:  MDD, recurrent, severe F33.2    Treatment Hx:  Treatment number this series: 32  Total lifetime treatment number: 32    Allergies   Allergen Reactions     Codeine Nausea and Vomiting     Penicillins Rash      /78 (BP Location: Left arm, Patient Position: Sitting, Cuff Size: Adult Regular)  Pulse 81    Pause for the Cause  Right patient:  Yes  Right procedure/correct coil:  Yes; rTMS; cpt 71006; H1 coil.   Earplugs in place:  Yes    Procedure  Patient was seated in procedure chair. Identity and procedure was verified. Ear plugs were placed in ears and patient-specific cap was placed on head and tightened appropriately. Ruler locations were verified. Coil was placed at treatment location and stimulator was set to parameters described below. A test train was delivered and pt tolerated train. Given pt tolerance, 55 treatment trains were delivered. Pt tolerated procedure with some facial and right hand movement.    Motor Threshold Determination  Distance from nasion to inion: 36 cm  MT 1: 0 - 7- 14.5 @ 49% on 9/12/2018  MT 2: 0 - 7- 14.5 @ 49% on 9/27/2018  MT 3: 0 - 7- 14.5 @ 47% on 10/11/2018  MT 4: 0 - 7- 14.5 @ 46% on 10/18/2018    Stimulation Parameters  Frequency: 18 Hz     Train duration: 2 sec  Total pulses delivered: 1980  Inter-train interval: 20 sec  Tx Loc: 0 - eyebrows  - 14.5  Energy: 55% (120% MT)  Trains: 55 trains      Post-Procedure Diagnosis:  MDD, recurrent, severe F33.2      Yanci Goss  RN   [facility] Neuromodulation      Plan   - Cont TMS    I didn t see the patient during/after treatment but remained available in the clinic during  treatment.    JONATHAN Snow MD  [facility] Neuromodulation

## 2018-10-30 NOTE — MR AVS SNAPSHOT
After Visit Summary   10/30/2018    Samira Romero    MRN: [id number]           Patient Information     Date Of Birth          1960        Visit Information        Provider Department      10/30/2018 1:45 PM [facility] PROCEDURE ROOM [facility] Psychiatry        Today's Diagnoses     Severe episode of recurrent major depressive disorder, without psychotic features (H)    -  1       Follow-ups after your visit        Your next 10 appointments already scheduled     Dec 18, 2018  1:00 PM [facility] FOLLOW-UP with Cristine Snow MD   [facility] ([facility])    [address] [address] [phone]              Who to contact     Please call your clinic at [phone] to:    Ask questions about your health    Make or cancel appointments    Discuss your medicines    Learn about your test results    Speak to your doctor            Additional Information About Your Visit        MyChart Information     [employer] gives you secure access to your electronic health record. If you see a primary care provider, you can also send messages to your care team and make appointments. If you have questions, please call your primary care clinic.  If you do not have a primary care provider, please call [phone] and they will assist you.      [employer] is an electronic gateway that provides easy, online access to your medical records. With [employer], you can request a clinic appointment, read your test results, renew a prescription or communicate with your care team.     To access your existing account, please contact your [facility] or call [phone] for assistance.        Care EveryWhere ID     This is your Care EveryWhere ID. This could be used by other organizations to access your [place] medical records  [id number]        Your Vitals Were     Pulse                   81            Blood Pressure from Last 3 Encounters:   11/07/18  132/89   11/05/18 111/77   10/31/18 113/72    Weight from Last 3 Encounters:   09/12/18 75.7 kg (166 lb 12.8 oz)   07/10/18 76.8 kg (169 lb 6.4 oz)              We Performed the Following     C TRANSCRANIAL MAGNETIC STIMULATION TREATMENT,DELIVERY/MANAGEMENT        Primary Care Provider Office Phone # Fax #    [facility] [phone] [address] [facility] [address]        Equal Access to Services     BIANCA KHAN : Hadii aad ku hadasho Soomaali, waaxda luqadaha, qaybta kaalmada adeegyada, waxay lloydin haybilln priscila hansen . So [facility] [phone].    ATENCIÓN: Si habla español, tiene a cole disposición servicios gratuitos de asistencia lingüística. [facility] [phone].    We comply with applicable federal civil rights laws and [place] laws. We do not discriminate on the basis of race, color, national origin, age, disability, sex, sexual orientation, or gender identity.            Thank you!     Thank you for choosing [facility] PSYCHIATRY  for your care. Our goal is always to provide you with excellent care. Hearing back from our patients is one way we can continue to improve our services. Please take a few minutes to complete the written survey that you may receive in the mail after your visit with us. Thank you!             Your Updated Medication List - Protect others around you: Learn how to safely use, store and throw away your medicines at www.disposemymeds.org.          This list is accurate as of 10/30/18 11:59 PM.  Always use your most recent med list.                   Brand Name Dispense Instructions for use Diagnosis    ALPRAZolam 0.5 MG tablet    XANAX     CANCEL PRIOR RX -- UPDATED DOSE INSTRUCTIONS -- Take one-half to one tab by mouth at bedtime as needed for anxiety.        amphetamine-dextroamphetamine 30 MG per 24 hr capsule    ADDERALL XR     Take 30 mg by mouth        buPROPion 150 MG 24 hr tablet    WELLBUTRIN XL     TAKE 2 TABLETS  EVERY       MORNING        cholecalciferol 5000 units (125 mcg) Caps capsule    vitamin D3          Fish Oil 435 MG Caps      Take 1,200 mg by mouth        ketorolac 60 MG/2ML Soln injection    TORADOL     Inject 30-60 mg into the muscle        MULTIPLE VITAMINS-MINERALS PO           Octacosanol 1000-5 MCG-UNIT Tabs           PARoxetine 30 MG tablet    PAXIL     Take 60 mg by mouth

## 2018-10-31 ENCOUNTER — OFFICE VISIT (OUTPATIENT)
Dept: PSYCHIATRY | Facility: CLINIC | Age: 58
End: 2018-10-31
Payer: COMMERCIAL

## 2018-10-31 VITALS — HEART RATE: 80 BPM | SYSTOLIC BLOOD PRESSURE: 113 MMHG | DIASTOLIC BLOOD PRESSURE: 72 MMHG

## 2018-10-31 DIAGNOSIS — F33.2 SEVERE EPISODE OF RECURRENT MAJOR DEPRESSIVE DISORDER, WITHOUT PSYCHOTIC FEATURES (H): Primary | ICD-10-CM

## 2018-10-31 ASSESSMENT — PATIENT HEALTH QUESTIONNAIRE - PHQ9: SUM OF ALL RESPONSES TO PHQ QUESTIONS 1-9: 2

## 2018-10-31 NOTE — PROGRESS NOTES
[facility] TMS Program  [address]  [place] [address]  TMS Procedure Note   Samira Romero MRN# [id number]  Age: 58 year old year old YOB: 1960    Pre-Procedure:  History and Physical: Reviewed in medical record  Consent Signed by: Samira Romero  On: 09/12/18    Clinical Narrative:  Pt tolerating treatment.      Indications for TMS:  MDD, recurrent, severe; 4+ medication trials (from 2+ classes) ineffective; Psychotherapy ineffective.     Pre-Procedure Diagnosis:  MDD, recurrent, severe F33.2    Treatment Hx:  Treatment number this series: 33  Total lifetime treatment number: 33     Allergies   Allergen Reactions     Codeine Nausea and Vomiting     Penicillins Rash      /72 (BP Location: Right arm, Patient Position: Sitting, Cuff Size: Adult Regular)  Pulse 80    Pause for the Cause  Right patient:  Yes  Right procedure/correct coil:  Yes; rTMS; cpt 87628; H1 coil.   Earplugs in place:  Yes    Procedure  Patient was seated in procedure chair. Identity and procedure was verified. Ear plugs were placed in ears and patient-specific cap was placed on head and tightened appropriately. Ruler locations were verified. Coil was placed at treatment location and stimulator was set to parameters described below. A test train was delivered and pt tolerated train. Given pt tolerance, 55 treatment trains were delivered. Pt tolerated procedure with some facial and right hand movement.    Motor Threshold Determination  Distance from nasion to inion: 36 cm  MT 1: 0 - 7- 14.5 @ 49% on 9/12/2018  MT 2: 0 - 7- 14.5 @ 49% on 9/27/2018  MT 3: 0 - 7- 14.5 @ 47% on 10/11/2018  MT 4: 0 - 7- 14.5 @ 46% on 10/18/2018    Stimulation Parameters  Frequency: 18 Hz     Train duration: 2 sec  Total pulses delivered: 1980  Inter-train interval: 20 sec  Tx Loc: 0 - eyebrows  - 14.5  Energy: 55% (120% MT)  Trains: 55 trains      Post-Procedure Diagnosis:  MDD, recurrent, severe F33.2      Cleve  Jose  [facility] Neuromodulation      Plan   - Cont TMS    I didn t see the patient during/after treatment but remained available in the clinic during  treatment.    Rudy Sanchez MD  [facility] Neuromodulation

## 2018-11-05 ENCOUNTER — OFFICE VISIT (OUTPATIENT)
Dept: PSYCHIATRY | Facility: CLINIC | Age: 58
End: 2018-11-05
Payer: COMMERCIAL

## 2018-11-05 VITALS — HEART RATE: 82 BPM | DIASTOLIC BLOOD PRESSURE: 77 MMHG | SYSTOLIC BLOOD PRESSURE: 111 MMHG

## 2018-11-05 DIAGNOSIS — F33.2 SEVERE EPISODE OF RECURRENT MAJOR DEPRESSIVE DISORDER, WITHOUT PSYCHOTIC FEATURES (H): Primary | ICD-10-CM

## 2018-11-05 ASSESSMENT — PATIENT HEALTH QUESTIONNAIRE - PHQ9: SUM OF ALL RESPONSES TO PHQ QUESTIONS 1-9: 6

## 2018-11-05 NOTE — PROGRESS NOTES
" [facility] TMS Program  [address], [address]  [place] [address]  TMS Procedure Note   Samira Romero MRN# [id number]  Age: 58 year old year old YOB: 1960    Pre-Procedure:  History and Physical: Reviewed in medical record  Consent Signed by: Samira Romero  On: 09/12/18    Clinical Narrative:  Pt tolerating treatment.  Met with patient to discuss response to TMS. She reports some improvement but also states that it was \"not a silver bullet\" as she had hoped. Also endorses some increased grief associated with visiting her mother over the weekend who is living in a memory unit.    Indications for TMS:  MDD, recurrent, severe; 4+ medication trials (from 2+ classes) ineffective; Psychotherapy ineffective.     Pre-Procedure Diagnosis:  MDD, recurrent, severe F33.2    Treatment Hx:  Treatment number this series: 34  Total lifetime treatment number: 34     Allergies   Allergen Reactions     Codeine Nausea and Vomiting     Penicillins Rash      /77 (BP Location: Right arm, Patient Position: Sitting, Cuff Size: Adult Regular)  Pulse 82    Pause for the Cause  Right patient:  Yes  Right procedure/correct coil:  Yes; rTMS; cpt 18792; H1 coil.   Earplugs in place:  Yes    Procedure  Patient was seated in procedure chair. Identity and procedure was verified. Ear plugs were placed in ears and patient-specific cap was placed on head and tightened appropriately. Ruler locations were verified. Coil was placed at treatment location and stimulator was set to parameters described below. A test train was delivered and pt tolerated train. Given pt tolerance, 55 treatment trains were delivered. Pt tolerated procedure with some facial and right hand movement.    Motor Threshold Determination  Distance from nasion to inion: 36 cm  MT 1: 0 - 7- 14.5 @ 49% on 9/12/2018  MT 2: 0 - 7- 14.5 @ 49% on 9/27/2018  MT 3: 0 - 7- 14.5 @ 47% on 10/11/2018  MT 4: 0 - 7- 14.5 @ 46% on 10/18/2018    Stimulation " Parameters  Frequency: 18 Hz     Train duration: 2 sec  Total pulses delivered: 1980  Inter-train interval: 20 sec  Tx Loc: 0 - eyebrows  - 14.5  Energy: 55% (120% MT)  Trains: 55 trains      Post-Procedure Diagnosis:  MDD, recurrent, severe F33.2      Cleve Garcia  [facility]      Plan   - Cont TMS    I did see the patient during/after treatment and remained available in the clinic during  treatment.    JONATHAN Snow MD  [facility]

## 2018-11-05 NOTE — MR AVS SNAPSHOT
After Visit Summary   11/5/2018    Samira Romero    MRN: [id number]           Patient Information     Date Of Birth          1960        Visit Information        Provider Department      11/5/2018 1:45 PM [facility] PROCEDURE ROOM [facility]        Today's Diagnoses     Severe episode of recurrent major depressive disorder, without psychotic features (H)    -  1       Follow-ups after your visit        Your next 10 appointments already scheduled     Nov 06, 2018  1:45 PM CST   TMS TREATMENT with ME [facility] PROCEDURE ROOM   [facility] ([facility])    [address]  [facility] [id number] [phone]            Nov 07, 2018 10:00 AM CST   TMS TREATMENT with [facility] PROCEDURE ROOM   [facility] ([facility])    [address] [place] [id number] [phone]              Who to contact     Please call your clinic at [phone] to:    Ask questions about your health    Make or cancel appointments    Discuss your medicines    Learn about your test results    Speak to your doctor            Additional Information About Your Visit        [facility] Information     [facility] gives you secure access to your electronic health record. If you see a primary care provider, you can also send messages to your care team and make appointments. If you have questions, please call your primary care clinic.  If you do not have a primary care provider, please call [phone] and they will assist you.      [facility] is an electronic gateway that provides easy, online access to your medical records. With [facility], you can request a clinic appointment, read your test results, renew a prescription or communicate with your care team.     To access your existing account, please contact your [facility] or call [phone] for assistance.        Care EveryWhere ID     This is your Care EveryWhere ID. This could be used by  other organizations to access your [facility] medical records  [id number]        Your Vitals Were     Pulse                   82            Blood Pressure from Last 3 Encounters:   11/05/18 111/77   10/31/18 113/72   10/30/18 108/78    Weight from Last 3 Encounters:   09/12/18 75.7 kg (166 lb 12.8 oz)   07/10/18 76.8 kg (169 lb 6.4 oz)              We Performed the Following     C TRANSCRANIAL MAGNETIC STIMULATION TREATMENT,DELIVERY/MANAGEMENT        Primary Care Provider Office Phone # Fax #    [facility] [phone] [address] [facility] [address]        Equal Access to Services     BIANCA KHAN : Hadii faye lordo Socassandra, waaxda luqadaha, qaybta kaalmada adeegyada, jaimee robertson. So [facility] [phone].    ATENCIÓN: Si habla español, tiene a cole disposición servicios gratuitos de asistencia lingüística. Llame al [phone].    We comply with applicable federal civil rights laws and [place] laws. We do not discriminate on the basis of race, color, national origin, age, disability, sex, sexual orientation, or gender identity.            Thank you!     Thank you for choosing [facility]  for your care. Our goal is always to provide you with excellent care. Hearing back from our patients is one way we can continue to improve our services. Please take a few minutes to complete the written survey that you may receive in the mail after your visit with us. Thank you!             Your Updated Medication List - Protect others around you: Learn how to safely use, store and throw away your medicines at www.disposemymeds.org.          This list is accurate as of 11/5/18  3:13 PM.  Always use your most recent med list.                   Brand Name Dispense Instructions for use Diagnosis    ALPRAZolam 0.5 MG tablet    XANAX     CANCEL PRIOR RX -- UPDATED DOSE INSTRUCTIONS -- Take one-half to one tab by mouth at bedtime as  needed for anxiety.        amphetamine-dextroamphetamine 30 MG per 24 hr capsule    ADDERALL XR     Take 30 mg by mouth        buPROPion 150 MG 24 hr tablet    WELLBUTRIN XL     TAKE 2 TABLETS EVERY       MORNING        cholecalciferol 5000 units Caps capsule    vitamin D3          Fish Oil 435 MG Caps      Take 1,200 mg by mouth        ketorolac 60 MG/2ML Soln injection    TORADOL     Inject 30-60 mg into the muscle        MULTIPLE VITAMINS-MINERALS PO           Octacosanol 1000-5 MCG-UNIT Tabs           PARoxetine 30 MG tablet    PAXIL     Take 60 mg by mouth

## 2018-11-07 ENCOUNTER — OFFICE VISIT (OUTPATIENT)
Dept: PSYCHIATRY | Facility: CLINIC | Age: 58
End: 2018-11-07
Payer: COMMERCIAL

## 2018-11-07 VITALS — DIASTOLIC BLOOD PRESSURE: 89 MMHG | HEART RATE: 77 BPM | SYSTOLIC BLOOD PRESSURE: 132 MMHG

## 2018-11-07 DIAGNOSIS — F33.2 SEVERE EPISODE OF RECURRENT MAJOR DEPRESSIVE DISORDER, WITHOUT PSYCHOTIC FEATURES (H): Primary | ICD-10-CM

## 2018-11-07 ASSESSMENT — PATIENT HEALTH QUESTIONNAIRE - PHQ9: SUM OF ALL RESPONSES TO PHQ QUESTIONS 1-9: 11

## 2018-11-07 NOTE — PROGRESS NOTES
[facility] TMS Program  [address], [address]  [place] [address]  TMS Procedure Note   Samira Romero MRN# [id number]  Age: 58 year old year old YOB: 1960    Pre-Procedure:  History and Physical: Reviewed in medical record  Consent Signed by: Samira Romero  On: 09/12/18    Clinical Narrative:  Pt tolerating treatment.     Indications for TMS:  MDD, recurrent, severe; 4+ medication trials (from 2+ classes) ineffective; Psychotherapy ineffective.     Pre-Procedure Diagnosis:  MDD, recurrent, severe F33.2    Treatment Hx:  Treatment number this series: 35  Total lifetime treatment number: 35     Allergies   Allergen Reactions     Codeine Nausea and Vomiting     Penicillins Rash      /89 (BP Location: Right arm, Patient Position: Sitting, Cuff Size: Adult Regular)  Pulse 77    Pause for the Cause  Right patient:  Yes  Right procedure/correct coil:  Yes; rTMS; cpt 87776; H1 coil.   Earplugs in place:  Yes    Procedure  Patient was seated in procedure chair. Identity and procedure was verified. Ear plugs were placed in ears and patient-specific cap was placed on head and tightened appropriately. Ruler locations were verified. Coil was placed at treatment location and stimulator was set to parameters described below. A test train was delivered and pt tolerated train. Given pt tolerance, 55 treatment trains were delivered. Pt tolerated procedure with some facial and right hand movement.    Motor Threshold Determination  Distance from nasion to inion: 36 cm  MT 1: 0 - 7- 14.5 @ 49% on 9/12/2018  MT 2: 0 - 7- 14.5 @ 49% on 9/27/2018  MT 3: 0 - 7- 14.5 @ 47% on 10/11/2018  MT 4: 0 - 7- 14.5 @ 46% on 10/18/2018    Stimulation Parameters  Frequency: 18 Hz     Train duration: 2 sec  Total pulses delivered: 1980  Inter-train interval: 20 sec  Tx Loc: 0 - eyebrows  - 14.5  Energy: 55% (120% MT)  Trains: 55 trains      Post-Procedure Diagnosis:  MDD, recurrent, severe F33.2      Cleve  Jose  [facility]      Plan   - Cont TMS    I did see the patient during/after treatment and remained available in the clinic during  treatment.    Rudy Sanchez MD  [facility]od[facility]

## 2020-03-01 ENCOUNTER — HEALTH MAINTENANCE LETTER (OUTPATIENT)
Age: 60
End: 2020-03-01

## 2020-12-14 ENCOUNTER — HEALTH MAINTENANCE LETTER (OUTPATIENT)
Age: 60
End: 2020-12-14

## 2021-04-17 ENCOUNTER — HEALTH MAINTENANCE LETTER (OUTPATIENT)
Age: 61
End: 2021-04-17

## 2021-10-02 ENCOUNTER — HEALTH MAINTENANCE LETTER (OUTPATIENT)
Age: 61
End: 2021-10-02

## 2022-03-13 ENCOUNTER — HEALTH MAINTENANCE LETTER (OUTPATIENT)
Age: 62
End: 2022-03-13

## 2022-05-08 ENCOUNTER — HEALTH MAINTENANCE LETTER (OUTPATIENT)
Age: 62
End: 2022-05-08

## 2023-01-14 ENCOUNTER — HEALTH MAINTENANCE LETTER (OUTPATIENT)
Age: 63
End: 2023-01-14

## 2023-06-02 ENCOUNTER — HEALTH MAINTENANCE LETTER (OUTPATIENT)
Age: 63
End: 2023-06-02